# Patient Record
Sex: FEMALE | Race: OTHER | NOT HISPANIC OR LATINO | Employment: FULL TIME | URBAN - NONMETROPOLITAN AREA
[De-identification: names, ages, dates, MRNs, and addresses within clinical notes are randomized per-mention and may not be internally consistent; named-entity substitution may affect disease eponyms.]

---

## 2020-01-12 ENCOUNTER — HOSPITAL ENCOUNTER (EMERGENCY)
Facility: HOSPITAL | Age: 25
Discharge: HOME | End: 2020-01-12
Attending: EMERGENCY MEDICINE | Admitting: EMERGENCY MEDICINE

## 2020-01-12 VITALS
BODY MASS INDEX: 20.16 KG/M2 | DIASTOLIC BLOOD PRESSURE: 106 MMHG | WEIGHT: 121 LBS | OXYGEN SATURATION: 100 % | RESPIRATION RATE: 18 BRPM | TEMPERATURE: 98.9 F | HEIGHT: 65 IN | HEART RATE: 107 BPM | SYSTOLIC BLOOD PRESSURE: 175 MMHG

## 2020-01-12 DIAGNOSIS — J02.9 SORE THROAT: Primary | ICD-10-CM

## 2020-01-12 LAB
FLUAV AG NPH QL: NEGATIVE
FLUBV AG NPH QL IA: NEGATIVE
S PYO AG THROAT QL: NEGATIVE

## 2020-01-12 PROCEDURE — 87880 STREP A ASSAY W/OPTIC: CPT | Performed by: EMERGENCY MEDICINE

## 2020-01-12 PROCEDURE — 87804 INFLUENZA ASSAY W/OPTIC: CPT | Performed by: EMERGENCY MEDICINE

## 2020-01-12 PROCEDURE — 99283 EMERGENCY DEPT VISIT LOW MDM: CPT

## 2020-01-12 PROCEDURE — 87081 CULTURE SCREEN ONLY: CPT | Performed by: EMERGENCY MEDICINE

## 2020-01-13 NOTE — ED PROVIDER NOTES
"Subjective   This patient states last night she had \"a hot flash\" had open her windows and turn on her fan and then got cold.  She never actually checked her temperature.  She complains of a sore throat.  No cough.  No abdominal pain nausea vomiting or diarrhea.  She is here with her mother who has similar complaints.          Review of Systems   Constitutional: Positive for chills.   HENT: Positive for sore throat.    Eyes: Negative.    Respiratory: Negative.    Cardiovascular: Negative.    Gastrointestinal: Negative.    Genitourinary: Negative.    Musculoskeletal: Negative.    Skin: Negative.    Neurological: Negative.    Psychiatric/Behavioral: Negative.        History reviewed. No pertinent past medical history.    No Known Allergies    History reviewed. No pertinent surgical history.    History reviewed. No pertinent family history.    Social History     Socioeconomic History   • Marital status: Single     Spouse name: Not on file   • Number of children: Not on file   • Years of education: Not on file   • Highest education level: Not on file   Tobacco Use   • Smoking status: Former Smoker     Last attempt to quit: 2019     Years since quittin.0   Substance and Sexual Activity   • Alcohol use: Not Currently           Objective   Physical Exam   Constitutional: She appears well-developed and well-nourished.   HENT:   Head: Normocephalic and atraumatic.   Right Ear: Tympanic membrane normal.   Left Ear: Tympanic membrane normal.   Mouth/Throat: Uvula is midline, oropharynx is clear and moist and mucous membranes are normal. No oral lesions. No uvula swelling. No oropharyngeal exudate, posterior oropharyngeal edema, posterior oropharyngeal erythema or tonsillar abscesses.   Neck: Normal range of motion. Neck supple.   Cardiovascular: Normal rate, regular rhythm and normal heart sounds.   Pulmonary/Chest: Effort normal and breath sounds normal.   Abdominal: Soft.   Neurological: She is alert.   Skin: Skin " is warm and dry.   Psychiatric: She has a normal mood and affect. Her behavior is normal.   Nursing note and vitals reviewed.      Procedures           ED Course  ED Course as of Jan 12 2151   Sun Jan 12, 2020 2125 Influenza A Ag, EIA: Negative [TM]   2125 Influenza B Ag, EIA: Negative [TM]   2127 Strep A Ag: Negative [TM]      ED Course User Index  [TM] Ryne Interiano PA-C                                               MDM  9:51 PM  Negative flu and strep testing.  Patient looks very well overall nontoxic in appearance.  Suspect viral etiology given she is here with her mother with the same symptoms.  Strict return to care precautions given.  Final diagnoses:   Sore throat            Ryne Interiano PA-C  01/12/20 2152

## 2020-01-14 LAB — BACTERIA SPEC AEROBE CULT: NORMAL

## 2020-01-27 ENCOUNTER — APPOINTMENT (OUTPATIENT)
Dept: GENERAL RADIOLOGY | Facility: HOSPITAL | Age: 25
End: 2020-01-27

## 2020-01-27 ENCOUNTER — HOSPITAL ENCOUNTER (EMERGENCY)
Facility: HOSPITAL | Age: 25
Discharge: HOME | End: 2020-01-27
Attending: EMERGENCY MEDICINE | Admitting: EMERGENCY MEDICINE

## 2020-01-27 VITALS
TEMPERATURE: 98.5 F | WEIGHT: 124.8 LBS | SYSTOLIC BLOOD PRESSURE: 149 MMHG | HEIGHT: 65 IN | RESPIRATION RATE: 20 BRPM | BODY MASS INDEX: 20.79 KG/M2 | HEART RATE: 94 BPM | OXYGEN SATURATION: 100 % | DIASTOLIC BLOOD PRESSURE: 90 MMHG

## 2020-01-27 DIAGNOSIS — E05.00 THYROTOXICOSIS WITH DIFFUSE GOITER AND WITHOUT THYROID STORM: Primary | ICD-10-CM

## 2020-01-27 LAB
ALBUMIN SERPL-MCNC: 4.3 G/DL (ref 3.5–5.2)
ALBUMIN/GLOB SERPL: 1.1 G/DL
ALP SERPL-CCNC: 141 U/L (ref 39–117)
ALT SERPL W P-5'-P-CCNC: 20 U/L (ref 1–33)
AMPHET+METHAMPHET UR QL: NEGATIVE
AMPHETAMINES UR QL: NEGATIVE
ANION GAP SERPL CALCULATED.3IONS-SCNC: 14 MMOL/L (ref 5–15)
AST SERPL-CCNC: 20 U/L (ref 1–32)
B-HCG UR QL: NEGATIVE
BARBITURATES UR QL SCN: NEGATIVE
BASOPHILS # BLD AUTO: 0.02 10*3/MM3 (ref 0–0.2)
BASOPHILS NFR BLD AUTO: 0.3 % (ref 0–1.5)
BENZODIAZ UR QL SCN: NEGATIVE
BILIRUB SERPL-MCNC: 0.2 MG/DL (ref 0.2–1.2)
BUN BLD-MCNC: 9 MG/DL (ref 6–20)
BUN/CREAT SERPL: 22.5 (ref 7–25)
BUPRENORPHINE SERPL-MCNC: NEGATIVE NG/ML
CALCIUM SPEC-SCNC: 10.1 MG/DL (ref 8.6–10.5)
CANNABINOIDS SERPL QL: NEGATIVE
CHLORIDE SERPL-SCNC: 101 MMOL/L (ref 98–107)
CO2 SERPL-SCNC: 22 MMOL/L (ref 22–29)
COCAINE UR QL: NEGATIVE
CREAT BLD-MCNC: 0.4 MG/DL (ref 0.57–1)
DEPRECATED RDW RBC AUTO: 35.2 FL (ref 37–54)
EOSINOPHIL # BLD AUTO: 0.19 10*3/MM3 (ref 0–0.4)
EOSINOPHIL NFR BLD AUTO: 2.4 % (ref 0.3–6.2)
ERYTHROCYTE [DISTWIDTH] IN BLOOD BY AUTOMATED COUNT: 14 % (ref 12.3–15.4)
GFR SERPL CREATININE-BSD FRML MDRD: >150 ML/MIN/1.73
GFR SERPL CREATININE-BSD FRML MDRD: >150 ML/MIN/1.73
GLOBULIN UR ELPH-MCNC: 3.8 GM/DL
GLUCOSE BLD-MCNC: 121 MG/DL (ref 65–99)
HCT VFR BLD AUTO: 38.9 % (ref 34–46.6)
HGB BLD-MCNC: 12.5 G/DL (ref 12–15.9)
HOLD SPECIMEN: NORMAL
HOLD SPECIMEN: NORMAL
HYPOCHROMIA BLD QL: NORMAL
IMM GRANULOCYTES # BLD AUTO: 0.01 10*3/MM3 (ref 0–0.05)
IMM GRANULOCYTES NFR BLD AUTO: 0.1 % (ref 0–0.5)
LYMPHOCYTES # BLD AUTO: 3.67 10*3/MM3 (ref 0.7–3.1)
LYMPHOCYTES NFR BLD AUTO: 47 % (ref 19.6–45.3)
MAGNESIUM SERPL-MCNC: 2.2 MG/DL (ref 1.6–2.6)
MCH RBC QN AUTO: 23 PG (ref 26.6–33)
MCHC RBC AUTO-ENTMCNC: 32.1 G/DL (ref 31.5–35.7)
MCV RBC AUTO: 71.5 FL (ref 79–97)
METHADONE UR QL SCN: NEGATIVE
MICROCYTES BLD QL: NORMAL
MONOCYTES # BLD AUTO: 0.75 10*3/MM3 (ref 0.1–0.9)
MONOCYTES NFR BLD AUTO: 9.6 % (ref 5–12)
NEUTROPHILS # BLD AUTO: 3.17 10*3/MM3 (ref 1.7–7)
NEUTROPHILS NFR BLD AUTO: 40.6 % (ref 42.7–76)
NRBC BLD AUTO-RTO: 0 /100 WBC (ref 0–0.2)
OPIATES UR QL: NEGATIVE
OXYCODONE UR QL SCN: NEGATIVE
PCP UR QL SCN: NEGATIVE
PLAT MORPH BLD: NORMAL
PLATELET # BLD AUTO: 321 10*3/MM3 (ref 140–450)
PMV BLD AUTO: 10.8 FL (ref 6–12)
POTASSIUM BLD-SCNC: 3.5 MMOL/L (ref 3.5–5.2)
PROPOXYPH UR QL: NEGATIVE
PROT SERPL-MCNC: 8.1 G/DL (ref 6–8.5)
RBC # BLD AUTO: 5.44 10*6/MM3 (ref 3.77–5.28)
SODIUM BLD-SCNC: 137 MMOL/L (ref 136–145)
T3 SERPL-MCNC: 390 NG/DL (ref 80–200)
T4 FREE SERPL-MCNC: 4.81 NG/DL (ref 0.93–1.7)
TRICYCLICS UR QL SCN: NEGATIVE
TROPONIN T SERPL-MCNC: <0.01 NG/ML (ref 0–0.03)
TSH SERPL DL<=0.05 MIU/L-ACNC: 0.01 UIU/ML (ref 0.27–4.2)
WBC MORPH BLD: NORMAL
WBC NRBC COR # BLD: 7.81 10*3/MM3 (ref 3.4–10.8)
WHOLE BLOOD HOLD SPECIMEN: NORMAL
WHOLE BLOOD HOLD SPECIMEN: NORMAL

## 2020-01-27 PROCEDURE — 84480 ASSAY TRIIODOTHYRONINE (T3): CPT | Performed by: EMERGENCY MEDICINE

## 2020-01-27 PROCEDURE — 83735 ASSAY OF MAGNESIUM: CPT | Performed by: EMERGENCY MEDICINE

## 2020-01-27 PROCEDURE — 84484 ASSAY OF TROPONIN QUANT: CPT | Performed by: EMERGENCY MEDICINE

## 2020-01-27 PROCEDURE — 99284 EMERGENCY DEPT VISIT MOD MDM: CPT

## 2020-01-27 PROCEDURE — 80306 DRUG TEST PRSMV INSTRMNT: CPT | Performed by: EMERGENCY MEDICINE

## 2020-01-27 PROCEDURE — 84443 ASSAY THYROID STIM HORMONE: CPT | Performed by: EMERGENCY MEDICINE

## 2020-01-27 PROCEDURE — 93005 ELECTROCARDIOGRAM TRACING: CPT | Performed by: EMERGENCY MEDICINE

## 2020-01-27 PROCEDURE — 71045 X-RAY EXAM CHEST 1 VIEW: CPT

## 2020-01-27 PROCEDURE — 85025 COMPLETE CBC W/AUTO DIFF WBC: CPT | Performed by: EMERGENCY MEDICINE

## 2020-01-27 PROCEDURE — 85007 BL SMEAR W/DIFF WBC COUNT: CPT | Performed by: EMERGENCY MEDICINE

## 2020-01-27 PROCEDURE — 81025 URINE PREGNANCY TEST: CPT | Performed by: EMERGENCY MEDICINE

## 2020-01-27 PROCEDURE — 84439 ASSAY OF FREE THYROXINE: CPT | Performed by: EMERGENCY MEDICINE

## 2020-01-27 PROCEDURE — 96374 THER/PROPH/DIAG INJ IV PUSH: CPT

## 2020-01-27 PROCEDURE — 80053 COMPREHEN METABOLIC PANEL: CPT | Performed by: EMERGENCY MEDICINE

## 2020-01-27 RX ORDER — METHIMAZOLE 5 MG/1
5 TABLET ORAL ONCE
Status: COMPLETED | OUTPATIENT
Start: 2020-01-27 | End: 2020-01-27

## 2020-01-27 RX ORDER — METOPROLOL TARTRATE 5 MG/5ML
5 INJECTION INTRAVENOUS ONCE
Status: COMPLETED | OUTPATIENT
Start: 2020-01-27 | End: 2020-01-27

## 2020-01-27 RX ORDER — SODIUM CHLORIDE 0.9 % (FLUSH) 0.9 %
10 SYRINGE (ML) INJECTION AS NEEDED
Status: DISCONTINUED | OUTPATIENT
Start: 2020-01-27 | End: 2020-01-27 | Stop reason: HOSPADM

## 2020-01-27 RX ORDER — METHIMAZOLE 5 MG/1
5 TABLET ORAL 3 TIMES DAILY
Qty: 90 TABLET | Refills: 0 | Status: SHIPPED | OUTPATIENT
Start: 2020-01-27 | End: 2020-08-12

## 2020-01-27 RX ADMIN — METHIMAZOLE 5 MG: 5 TABLET ORAL at 04:42

## 2020-01-27 RX ADMIN — METOPROLOL TARTRATE 5 MG: 1 INJECTION, SOLUTION INTRAVENOUS at 02:35

## 2020-01-27 RX ADMIN — METOPROLOL TARTRATE 25 MG: 25 TABLET ORAL at 03:34

## 2020-01-27 NOTE — ED PROVIDER NOTES
Subjective   History of Present Illness    Chief Complaint: Racing heart rate  History of Present Illness: 24-year-old female with above complaint, current risk, feels paresthesias to her hands.  Never had any issues like this in the past.  Reported mild Goiter, found on recent physical exam for work.  And no thyroid meds.  Denies any illicit or recreational drugs or stimulants.  No birth control  Onset: Tonight just prior to arrival  Duration: Single episode persistent symptoms  Exacerbating / Alleviating factors: None  Associated symptoms: None      Nurses Notes reviewed and agree, including vitals, allergies, social history and prior medical history.     REVIEW OF SYSTEMS: All systems reviewed and not pertinent unless noted.    Positive for: Palpitations and paresthesias    Negative for: Chest pain syncope hemoptysis  Review of Systems    History reviewed. No pertinent past medical history.    No Known Allergies    History reviewed. No pertinent surgical history.    History reviewed. No pertinent family history.    Social History     Socioeconomic History   • Marital status: Single     Spouse name: Not on file   • Number of children: Not on file   • Years of education: Not on file   • Highest education level: Not on file   Tobacco Use   • Smoking status: Former Smoker     Last attempt to quit: 2019     Years since quittin.0   Substance and Sexual Activity   • Alcohol use: Not Currently           Objective   Physical Exam      GENERAL APPEARANCE: Well developed, well nourished, in no acute distress.  VITAL SIGNS: per nursing, reviewed and noted  SKIN: no rashes, ulcerations or petechiae.  Head: Normocephalic, atraumatic.   EYES: perrla. EOMI.  ENT:  TM clear, posterior pharynx patent.  LUNGS:  normal breath sounds. No retractions.   CARDIOVASCULAR:  regular rhythm with tachycardia, no murmurs.  Good Peripheral pulses.  ABDOMEN: Soft, nontender, normal bowel sounds. No hernia. No  ascites.  MUSCULOSKELETAL:  No tenderness. Full ROM. Strength and tone normal.  NEUROLOGIC: Alert, oriented x 3. No gross deficits.   NECK: Supple, symmetric. No tenderness, symmetric diffuse fullness of the thyroid. Full ROM  Back: full rom, no paraspinal spasm. No CVA tenderness.   PSYCH: appropriate affect, no suicidal or homicidal ideation.  : no bladder tenderness or distention, no CVA tenderness      Procedures     No attending provider procedures were performed      ED Course  ED Course as of Jan 28 0145   Mon Jan 27, 2020 0319 HCG, Urine QL: Negative [PF]   0319 Troponin T: <0.010 [PF]   0319 Magnesium: 2.2 [PF]   0321 WBC: 7.81 [PF]   0321 Hemoglobin: 12.5 [PF]   0321 Hematocrit: 38.9 [PF]   0321 Platelets: 321 [PF]   0321 Glucose(!): 121 [PF]   0321 BUN: 9 [PF]   0321 Creatinine(!): 0.40 [PF]   0321 Sodium: 137 [PF]   0321 Potassium: 3.5 [PF]   0321 Chloride: 101 [PF]   0321 CO2: 22.0 [PF]   0321 Calcium: 10.1 [PF]   0321 Total Protein: 8.1 [PF]   0321 Albumin: 4.30 [PF]   0329 TSH Baseline(!): 0.011 [PF]   0449 EKG interpreted by me reveals sinus tachycardia at a rate of 153.  No ectopy no ischemic changes    [PF]      ED Course User Index  [PF] Tang Gonzalez,                                                MDM  Work-up consistent with thyrotoxicosis, added methimazole and metoprolol for patient's high blood pressure and tachycardia.  Advised outpatient follow-up and return precautions were discussed in detail.  Final diagnoses:   Thyrotoxicosis with diffuse goiter and without thyroid storm            Tang Gonzalez,   01/28/20 0145

## 2020-02-26 ENCOUNTER — TRANSCRIBE ORDERS (OUTPATIENT)
Dept: ULTRASOUND IMAGING | Facility: HOSPITAL | Age: 25
End: 2020-02-26

## 2020-02-26 DIAGNOSIS — E04.9 ENLARGED THYROID: Primary | ICD-10-CM

## 2020-05-26 ENCOUNTER — TELEPHONE (OUTPATIENT)
Dept: SURGERY | Facility: CLINIC | Age: 25
End: 2020-05-26

## 2020-05-26 NOTE — TELEPHONE ENCOUNTER
Patient no showed for appointment with Dr Reyez . Called patient she stated she  Is working and will have to call back at a later date and reschedule. No show letter mailed to patient.

## 2020-07-14 ENCOUNTER — TELEMEDICINE (OUTPATIENT)
Dept: ENDOCRINOLOGY | Facility: CLINIC | Age: 25
End: 2020-07-14

## 2020-07-14 VITALS — HEIGHT: 65 IN | WEIGHT: 139 LBS | BODY MASS INDEX: 23.16 KG/M2

## 2020-07-14 DIAGNOSIS — E05.90 HYPERTHYROIDISM: Primary | ICD-10-CM

## 2020-07-14 PROCEDURE — 99203 OFFICE O/P NEW LOW 30 MIN: CPT | Performed by: INTERNAL MEDICINE

## 2020-07-14 NOTE — PROGRESS NOTES
Chief Complaint   Patient presents with   • Establish Care   • Hyperthyroidism        New patient who is being seen in consultation regarding hyperthyroidism at the request of Ivania Carter APRN HPI   Gillian Marlow is a 24 y.o. female who presents for evaluation of hyperthyroidism.    This was an audio and video enabled telemedicine encounter. A minimum of 20 minutes was spent in contact with the patient.    Patient presents today for evaluation of hyperthyroidism which was diagnosed in January 2020 when patient presented with symptoms of elevated heart rate. Patient denies known history of thyroid dysfunction prior to this. Patient was started on methimazole and metoprolol.    She is currently taking methimazole 10 mg twice daily and metoprolol 25 mg twice daily.  She reports that since last changed which is on endocrinology at   in March.    Patient reports palpitations and anxiety are much improved. She had anxiety prior to this diagnosis.   Patient denies changes in bowel habits.   Patient reports heat intolerance.  Patient reports changes in weight. Patient had initial inability to gain weight but more recently has gained weight.  Patient  Denies hair, skin or nail changes.  Patient reports tremor has resolved.  Patient reports good energy level.    Patient denies history of previous head/neck radiation.  Patient denies history of recent iodine exposure.  Patient denies taking OTC supplements such as biotin.  Patient denies personal or family history of thyroid cancer.     History reviewed. No pertinent past medical history.  History reviewed. No pertinent surgical history.   Family History   Problem Relation Age of Onset   • Anemia Mother    • No Known Problems Father    • Thyroid disease Other       Social History     Socioeconomic History   • Marital status: Single     Spouse name: Not on file   • Number of children: Not on file   • Years of education: Not on file   • Highest education level: Not on  "file   Tobacco Use   • Smoking status: Former Smoker     Last attempt to quit: 2019     Years since quittin.5   Substance and Sexual Activity   • Alcohol use: Not Currently      No Known Allergies   Current Outpatient Medications on File Prior to Visit   Medication Sig Dispense Refill   • methIMAzole (TAPAZOLE) 5 MG tablet Take 1 tablet by mouth 3 (Three) Times a Day. (Patient taking differently: Take 10 mg by mouth 2 (Two) Times a Day.) 90 tablet 0   • metoprolol tartrate (LOPRESSOR) 25 MG tablet Take 1 tablet by mouth 2 (Two) Times a Day. 60 tablet 0     No current facility-administered medications on file prior to visit.         Review of Systems   Constitutional: Positive for unexpected weight gain and unexpected weight loss. Negative for fatigue.   HENT: Negative for trouble swallowing and voice change.    Eyes: Negative for pain and visual disturbance.   Respiratory: Negative for cough and shortness of breath.    Cardiovascular: Negative for chest pain and palpitations.   Gastrointestinal: Negative for constipation and diarrhea.   Endocrine: Positive for heat intolerance. Negative for cold intolerance.   Musculoskeletal: Negative for arthralgias and myalgias.   Skin: Negative for dry skin and rash.   Neurological: Positive for headache. Negative for tremors.   Psychiatric/Behavioral: Negative for sleep disturbance. The patient is nervous/anxious.       Vitals:    20 1040   Weight: 63 kg (139 lb)   Height: 165.1 cm (65\")   Body mass index is 23.13 kg/m².     Physical Exam   General: well appearing, in no acute distress  Eyes:pupils equal and round  ENMT: normal external appearance of the ears and nose  Respiratory: no increased work of breathing  Musculoskeletal: no obvious deformity  Skin: no rash or ulcerations noted  Neuro: alert, answers questions appropriately  Psych: appropriate mood and affect    Labs/Imaging  Results for DINORA FERREIRA (MRN [id number]) as of 2020 10:36   Ref. " Range 1/27/2020 02:27   TSH Baseline Latest Ref Range: 0.270 - 4.200 uIU/mL 0.011 (L)   Free T4 Latest Ref Range: 0.93 - 1.70 ng/dL 4.81 (H)   T3, Total Latest Ref Range: 80.0 - 200.0 ng/dl 390.0 (H)       Assessment and Plan    Gillian was seen today for establish care and hyperthyroidism.    Diagnoses and all orders for this visit:    Hyperthyroidism  - symptomatically improved since diagnosis, no repeat labs available  - will obtain prior records from [facility] and recent labs from PCP  - currently taking metoprolol 25 mg daily  - currently taking methimazole 10 mg twice daily- adjust as needed after review of labs.  -- Potential risks of untreated hyperthyroidism were discussed with patient.  -The risks and benefits of methimazole and when to seek care were reviewed with the patient. Potential risks include, but are not limited to, hepatic dysfunction, agranulocytosis, rash, vasculitis, iatrogenic hypothyroidism.  - Risks associated with thyroid hormone dysregulation as well as the risks associated with methimazole use during pregnancy were discussed with the patient.  Patient was counseled to use reliable contraception.  If she were to become pregnant, patient instructed to contact the clinic for further instructions.  - symptoms of both hypothyroidism and hyperthyroidism were discussed with the patient. Patient to contact the clinic in the interim between visits with any concerning changes.  - briefly discussed options for long term management of hyperthyroidism including methimazole use, LEGGETT and thyroidectomy. Will discuss further at follow up.     Addendum dated 8/12/2020, reviewed prior labs from PCP  Labs dated 6/19/2020  Free T3 2.5  Total T3 91  TSH 1.7  Free T4 0.75  Free T4 slightly low, remainder TFTs normal, will plan to reduce methimazole to 15 mg daily.    Return in about 3 months (around 10/14/2020) for Next scheduled follow up. The patient was instructed to contact the clinic with any interval questions  or concerns.    Dayan Shea MD     Please note that portions of this document were completed using a voice recognition program. Efforts were made to edit the dictations, but occasionally words are mis-transcribed.

## 2020-07-15 ENCOUNTER — TELEPHONE (OUTPATIENT)
Dept: ENDOCRINOLOGY | Facility: CLINIC | Age: 25
End: 2020-07-15

## 2020-07-15 NOTE — TELEPHONE ENCOUNTER
Fax request was faxed to:  [phone], [facility].    Requested recent labs (2 weeks ago).

## 2020-07-15 NOTE — TELEPHONE ENCOUNTER
----- Message from Dayan Shea MD sent at 7/14/2020 10:33 AM EDT -----  Could you please contact the [facility] in [place] to get her labs from approximately 2 weeks ago?

## 2020-08-12 DIAGNOSIS — E05.90 HYPERTHYROIDISM: Primary | ICD-10-CM

## 2020-08-12 RX ORDER — METHIMAZOLE 5 MG/1
15 TABLET ORAL DAILY
Qty: 90 TABLET | Refills: 1 | Status: SHIPPED | OUTPATIENT
Start: 2020-08-12 | End: 2020-08-13 | Stop reason: SDUPTHER

## 2020-08-13 ENCOUNTER — TELEPHONE (OUTPATIENT)
Dept: ENDOCRINOLOGY | Facility: CLINIC | Age: 25
End: 2020-08-13

## 2020-08-13 DIAGNOSIS — E05.90 HYPERTHYROIDISM: ICD-10-CM

## 2020-08-13 RX ORDER — METHIMAZOLE 5 MG/1
15 TABLET ORAL DAILY
Qty: 90 TABLET | Refills: 1 | Status: SHIPPED | OUTPATIENT
Start: 2020-08-13 | End: 2020-09-08 | Stop reason: DRUGHIGH

## 2020-08-13 NOTE — TELEPHONE ENCOUNTER
Informed patient of lab results, pt voiced understanding.    Dr. Shea, please advise on rx for Methimazole, can we send a rx for 5 mg.  Patient needs to take 15 mg daily.    Thank you.

## 2020-08-13 NOTE — TELEPHONE ENCOUNTER
PATIENT STATES SHE RECEIVED A CALL FROM US ABOUT LAB RESULTS. SHE IS RETURNING THE CALL AND WOULD LIKE A CALL BACK TO DISCUSS LABS. PHONE NUMBER [phone]

## 2020-08-13 NOTE — TELEPHONE ENCOUNTER
pt states the pharmacy did not have the rx for 5 mg, will resend.  Patient state she will continue to take the medication she has left over, by taking one pill and scoring another to get half of it.    Patient asked if she can use a knife to score a pill.  Informed patient that we don't recommend this, but if she is able to do this and not waste a pill then go ahead.  Normally a pill is score with a pill cutter, but patient does not have one.

## 2020-09-02 NOTE — PROGRESS NOTES
Patient: Gillian Marolw    YOB: 1995    Date: 09/08/2020    Primary Care Provider: Ivania Carter APRN    Chief Complaint   Patient presents with   • Establish Care   • Cyst     left side of face for 1 year and has gotten bigger since        Subjective .     History of present illness: Patient with a 1 year history of a left facial lesion that has grown during that time.  She states that has no associated pain.  No drainage.  Nothing makes it better or worse.    The following portions of the patient's history were reviewed and updated as appropriate: allergies, current medications, past family history, past medical history, past social history, past surgical history and problem list.    Review of Systems   Constitutional: Negative for chills, fever and unexpected weight change.   HENT: Negative for trouble swallowing and voice change.    Eyes: Negative for visual disturbance.   Respiratory: Negative for apnea, cough, chest tightness and wheezing.    Cardiovascular: Negative for chest pain, palpitations and leg swelling.   Gastrointestinal: Negative for abdominal distention, abdominal pain, anal bleeding, blood in stool, constipation, diarrhea, nausea, rectal pain and vomiting.   Endocrine: Negative for cold intolerance and heat intolerance.   Genitourinary: Negative for difficulty urinating, dysuria, flank pain and hematuria.   Musculoskeletal: Negative for back pain, gait problem and joint swelling.   Skin: Negative for color change, rash and wound.   Neurological: Negative for dizziness, syncope, speech difficulty, weakness, numbness and headaches.   Hematological: Negative for adenopathy. Does not bruise/bleed easily.   Psychiatric/Behavioral: Negative for confusion. The patient is not nervous/anxious.        History:  Past Medical History:   Diagnosis Date   • Thyroid disease        Past Surgical History:   Procedure Laterality Date   • NO PAST SURGERIES         Family History   Problem Relation  "Age of Onset   • Anemia Mother    • No Known Problems Father    • Thyroid disease Other        Social History     Tobacco Use   • Smoking status: Former Smoker     Last attempt to quit: 2019     Years since quittin.7   • Smokeless tobacco: Never Used   Substance Use Topics   • Alcohol use: Not Currently   • Drug use: Never        Allergies:  No Known Allergies    Medications:    Current Outpatient Medications:   •  methIMAzole (TAPAZOLE) 10 MG tablet, , Disp: , Rfl:   •  metoprolol tartrate (LOPRESSOR) 25 MG tablet, Take 1 tablet by mouth 2 (Two) Times a Day., Disp: 60 tablet, Rfl: 0    Objective     Vital Signs:   Vitals:    20 0947   BP: 110/80   Pulse: 78   SpO2: 98%   Weight: 62.6 kg (138 lb)   Height: 165.1 cm (65\")       Physical Exam:  General Appearance:    Alert, cooperative, in no acute distress   Head:    Normocephalic, without obvious abnormality, atraumatic   Eyes:            Normal.  No scleral icterus.  PERRLA    Lungs:     Clear to auscultation,respirations regular, even and                  unlabored    Heart:    Regular rhythm and normal rate, normal S1 and S2, no            murmur   Abdomen:     Normal bowel sounds, no masses, no organomegaly, soft        non-tender, non-distended, no guarding,    Extremities:   Moves all extremities well, no edema, no cyanosis, no             redness   Skin:  Left preauricular 2 cm likely subcutaneous sebaceous cyst  although it is overlying the parotid gland.  Slightly mobile   Neurologic:   Normal without gross deficits.   Psychiatric: No evidence of depression or anxiety          Results Review:   I reviewed the patient's new clinical results.  Ultrasound was reviewed    Review of Systems was reviewed and confirmed as accurate as documented by the MA.    Assessment/Plan     1. Soft tissue mass      Patient with what appears to be a sebaceous cyst based on ultrasound rather than a parotid tumor.  I have offered to remove this for her.  She " understands the procedure as well as the risk of bleeding and infection and she wishes to proceed.      Electronically signed by Dawson Reyez MD  09/08/20  10:04

## 2020-09-08 ENCOUNTER — OFFICE VISIT (OUTPATIENT)
Dept: SURGERY | Facility: CLINIC | Age: 25
End: 2020-09-08

## 2020-09-08 VITALS
WEIGHT: 138 LBS | SYSTOLIC BLOOD PRESSURE: 110 MMHG | BODY MASS INDEX: 22.99 KG/M2 | DIASTOLIC BLOOD PRESSURE: 80 MMHG | HEART RATE: 78 BPM | HEIGHT: 65 IN | OXYGEN SATURATION: 98 %

## 2020-09-08 DIAGNOSIS — M79.89 SOFT TISSUE MASS: Primary | ICD-10-CM

## 2020-09-08 DIAGNOSIS — L72.3 SEBACEOUS CYST: Primary | ICD-10-CM

## 2020-09-08 PROCEDURE — 99203 OFFICE O/P NEW LOW 30 MIN: CPT | Performed by: SURGERY

## 2020-09-08 RX ORDER — METHIMAZOLE 10 MG/1
TABLET ORAL
COMMUNITY
Start: 2020-08-10 | End: 2020-09-17

## 2020-09-17 ENCOUNTER — TELEPHONE (OUTPATIENT)
Dept: ENDOCRINOLOGY | Facility: CLINIC | Age: 25
End: 2020-09-17

## 2020-09-17 DIAGNOSIS — E05.90 HYPERTHYROIDISM: Primary | ICD-10-CM

## 2020-09-17 RX ORDER — METHIMAZOLE 5 MG/1
5 TABLET ORAL DAILY
Qty: 90 TABLET | Refills: 11 | Status: CANCELLED | OUTPATIENT
Start: 2020-09-17 | End: 2021-09-17

## 2020-09-17 RX ORDER — METHIMAZOLE 5 MG/1
5 TABLET ORAL DAILY
Qty: 90 TABLET | Refills: 5 | Status: SHIPPED | OUTPATIENT
Start: 2020-09-17 | End: 2020-11-12 | Stop reason: SDUPTHER

## 2020-09-17 RX ORDER — METHIMAZOLE 10 MG/1
15 TABLET ORAL DAILY
Qty: 45 TABLET | Refills: 5 | Status: SHIPPED | OUTPATIENT
Start: 2020-09-17 | End: 2020-09-17

## 2020-09-17 NOTE — TELEPHONE ENCOUNTER
PT NEEDS A REFILL ON HER METHIMAZOLE  SHE STATES THAT THE DOSAGE WAS CHANGED FROM 10 TO 15 SO THE RX NEEDS TO BE CHANGED. THE REFILL WAS SENT IN WITH THE WRONG DOSAGE AND TO THE WRONG PHARM.    NEEDS TO BE SENT TO [facility] IN [place], [facility] THAN [facility].    PT IS COMPLETELY OUT OF HER MEDICATION, CAN THIS PLEASE BE SENT IN TODAY.

## 2020-09-17 NOTE — TELEPHONE ENCOUNTER
Sent refill for Methimazole 5 mg x 3 tabs daily. To the [place] pharmacy patient requested.    OK to send refill per Dr. Shea's note.

## 2020-09-17 NOTE — TELEPHONE ENCOUNTER
Of note, I have not previously prescribed methimazole for patient.     Refills for methimazole 15  Mg daily sent to patient's preferred pharmacy

## 2020-09-17 NOTE — TELEPHONE ENCOUNTER
Patient requesting we send Methimazole 5 mg, x 3 tabs daily to her pharmacy [facility].     Patient states she does not want the 10 mg dose because she does not have a pill cutter.  Patient was upset and rude over the phone.      Dr. Shea, please advise, OK to send Methimazole 5 mg x 3 tabs daily?  Thank you.

## 2020-09-17 NOTE — TELEPHONE ENCOUNTER
Dr. Shea, please advise on dose for Methimazole.  Thank you.    Currently, we have a dose of 10 mg in the chart.  There are no directions.  Patient needs to take 15 mg, pt would take 1 and 1/2 tabs?

## 2020-09-17 NOTE — TELEPHONE ENCOUNTER
Okay to send methimazole 5 mg tablets, 3 times daily, please remove the other prescription from our system.

## 2020-10-06 ENCOUNTER — PROCEDURE VISIT (OUTPATIENT)
Dept: SURGERY | Facility: CLINIC | Age: 25
End: 2020-10-06

## 2020-10-06 DIAGNOSIS — L72.3 SEBACEOUS CYST: Primary | ICD-10-CM

## 2020-10-06 PROCEDURE — 11442 EXC FACE-MM B9+MARG 1.1-2 CM: CPT | Performed by: SURGERY

## 2020-10-06 PROCEDURE — 12051 INTMD RPR FACE/MM 2.5 CM/<: CPT | Performed by: SURGERY

## 2020-10-12 NOTE — PROGRESS NOTES
"Patient: Gillian Marlow    YOB: 1995    Date: 10/13/2020    Primary Care Provider: Ivania Carter APRN    Chief Complaint   Patient presents with   • Post-op Follow-up     excision       History of present illness:  I saw the patient in the office today as a followup from their recent lesion/sebaceous cyst excision on the left face.  They state that they have done well and are having no problems.    Vital Signs:  Vitals:    10/13/20 1118   BP: 132/84   Pulse: 85   Temp: 98.2 °F (36.8 °C)   TempSrc: Temporal   SpO2: 98%   Weight: 62.6 kg (138 lb 0.1 oz)   Height: 165.1 cm (65\")       Physical Exam:   General Appearance:    Alert, cooperative, in no acute distress, wound clean dry without infection       Assessment / Plan:    1. Postoperative visit        Wound is healing nicely.  No evidence of infection.  Doing well.  Follow-up as needed.    Electronically signed by Dawson Reyez MD  10/13/20    Portions of this note have been scribed for Dawson Reyez MD by Michelle Milligan. 10/13/2020  11:24 EDT                        "

## 2020-10-13 ENCOUNTER — OFFICE VISIT (OUTPATIENT)
Dept: SURGERY | Facility: CLINIC | Age: 25
End: 2020-10-13

## 2020-10-13 VITALS
HEART RATE: 85 BPM | TEMPERATURE: 98.2 F | OXYGEN SATURATION: 98 % | HEIGHT: 65 IN | DIASTOLIC BLOOD PRESSURE: 84 MMHG | SYSTOLIC BLOOD PRESSURE: 132 MMHG | BODY MASS INDEX: 22.99 KG/M2 | WEIGHT: 138.01 LBS

## 2020-10-13 DIAGNOSIS — Z48.89 POSTOPERATIVE VISIT: Primary | ICD-10-CM

## 2020-10-13 PROCEDURE — 99024 POSTOP FOLLOW-UP VISIT: CPT | Performed by: SURGERY

## 2020-10-14 ENCOUNTER — TELEMEDICINE (OUTPATIENT)
Dept: ENDOCRINOLOGY | Facility: CLINIC | Age: 25
End: 2020-10-14

## 2020-10-14 VITALS — BODY MASS INDEX: 22.97 KG/M2 | HEIGHT: 65 IN

## 2020-10-14 DIAGNOSIS — E05.90 HYPERTHYROIDISM: Primary | ICD-10-CM

## 2020-10-14 PROCEDURE — 99213 OFFICE O/P EST LOW 20 MIN: CPT | Performed by: INTERNAL MEDICINE

## 2020-10-14 NOTE — PROGRESS NOTES
"Chief Complaint   Patient presents with   • Follow-up   • Hyperthyroidism        HPI   Gillian Marlow is a 25 y.o. female had concerns including Follow-up and Hyperthyroidism.      This was an audio and video enabled telemedicine encounter.  Total of 13 minutes was spent in direct contact with the patient    Patient reports that she has been doing generally well in the interim since her last visit.  She does report that she has had a cyst removed from her cheek since last appointment but states that this is healing well.  She is continued on methimazole 15 mg daily and metoprolol 25 mg twice daily.  She denies any issues with these medications or new concerns.  She reports that her weight has been stable.  She denies any palpitations, diarrhea, constipation.  She denies any difficulty breathing, difficulty swallowing, voice changes.    The following portions of the patient's history were reviewed and updated as appropriate: allergies, current medications and past social history.    Review of Systems   Constitutional: Negative for unexpected weight gain and unexpected weight loss.   Cardiovascular: Negative for palpitations.   Gastrointestinal: Negative for constipation and diarrhea.      Ht 165.1 cm (65\")   BMI 22.97 kg/m²      Physical Exam    General: well appearing, in no acute distress  Respiratory: no increased work of breathing  Neuro: alert, answers questions appropriately  Psych: appropriate mood and affect    Labs/Imaging  Labs dated 6/19/2020  Free T3 2.5  Total T3 91  TSH 1.7  Free T4 0.75    Diagnoses and all orders for this visit:    1. Hyperthyroidism (Primary)  -Patient currently taking methimazole 15 mg daily and metoprolol 25 mg twice daily  -Patient is clinically euthyroid  -We will again request prior endocrine note from [facility].  Suspect underlying Graves' disease as etiology given patient's appearance that this is difficult to confirm without being able to perform a thyroid exam.  -We will plan for " repeat thyroid function testing and antibody testing.  -We will adjust methimazole as needed based on labs  -Discussed potential options for long-term management and how these may vary based on etiology of patient's hyperthyroidism.  -Reviewed need for use of reliable contraception while thyroid hormone remains abnormal and again reviewed that methimazole is known to cross the placenta and can have adverse effects on potential fetus.  -Next steps to be determined based on repeat labs, symptoms of hyperthyroidism reviewed in detail with the patient.  Patient instructed to contact clinic in the interim between visits with any concerning changes.  -     TSH; Future  -     T4, Free; Future  -     T3, Free; Future  -     Thyroid Stimulating Immunoglobulin; Future         Return in about 3 months (around 1/14/2021). The patient was instructed to contact the clinic with any interval questions or concerns.    Dayan Shea MD   Endocrinologist    Please note that portions of this document were completed with a voice recognition program. Efforts were made to edit the dictations, but occasionally words are mis-transcribed.

## 2020-10-19 ENCOUNTER — RESULTS ENCOUNTER (OUTPATIENT)
Dept: ENDOCRINOLOGY | Facility: CLINIC | Age: 25
End: 2020-10-19

## 2020-10-19 DIAGNOSIS — E05.90 HYPERTHYROIDISM: ICD-10-CM

## 2020-11-12 ENCOUNTER — TELEPHONE (OUTPATIENT)
Dept: ENDOCRINOLOGY | Facility: CLINIC | Age: 25
End: 2020-11-12

## 2020-11-12 RX ORDER — METHIMAZOLE 5 MG/1
10 TABLET ORAL DAILY
Qty: 60 TABLET | Refills: 5 | Status: SHIPPED | OUTPATIENT
Start: 2020-11-12 | End: 2021-05-10 | Stop reason: SDUPTHER

## 2020-11-12 NOTE — TELEPHONE ENCOUNTER
----- Message from Dayan Shea MD sent at 11/12/2020 10:59 AM EST -----  See result letter, please contact patient regarding dose change

## 2021-01-14 ENCOUNTER — TELEPHONE (OUTPATIENT)
Dept: ENDOCRINOLOGY | Facility: CLINIC | Age: 26
End: 2021-01-14

## 2021-01-14 NOTE — TELEPHONE ENCOUNTER
Patient wanted to let Dr Shea know that she had to r/s her appt on 1/26. Nov is scheduled for 2/18/2021.

## 2021-02-11 NOTE — TELEPHONE ENCOUNTER
RX ORIGINALLY WRITTEN BY ANOTHER PROVIDER. PT STATES THAT IT WAS PRESCRIBED BY HER PREVIOUS ENDO PROVIDER FOR PALPITATIONS.

## 2021-02-18 ENCOUNTER — TELEPHONE (OUTPATIENT)
Dept: ENDOCRINOLOGY | Facility: CLINIC | Age: 26
End: 2021-02-18

## 2021-02-18 NOTE — TELEPHONE ENCOUNTER
Pt stated that she was supposed to have a virtual appt, and was advised to go through doxy. She stated that she was in the virtual room and after waiting 15 + minutes she had to go back to work. She is upset and is requesting to talk to . She stated that she works 8 a.m to 4:30 everyday. Please advise on a sooner appt that may work best for her?

## 2021-02-18 NOTE — TELEPHONE ENCOUNTER
Rescheduled Doxy appt. with pt.  Due to my mistake had to reschedule pt.  Dr. Shea did not know pt was logged in to Doxy, was expecting pt via Sphere 3d video.    Patient accepted my apology and was fine with rescheduling f/u appointment.      Asked pt to call me a few minutes before her 2 pm appt. On 02/23/21 in order to make sure all is fine and she is logged in to Dox[facility].  Informed pt her appt. Is only 15 minutes.  Pt voiced understanding.

## 2021-02-19 NOTE — TELEPHONE ENCOUNTER
Patient called back stating that the time wasn't a good time for her anymore due to work, but said she could do it during her lunch on Tuesday at 11:15-12:15? Please advise and let the patient know if we can squeeze her in elsewhere.

## 2021-02-19 NOTE — TELEPHONE ENCOUNTER
Please contact patient. I do not have an official opening during the time she requested. I can try to squeeze her in at noon, but please let patient know that it is possible I could be running behind as this is not a formal opening in my schedule.    Also, okay to rebook into a different OB slot.

## 2021-02-23 ENCOUNTER — TELEPHONE (OUTPATIENT)
Dept: ENDOCRINOLOGY | Facility: CLINIC | Age: 26
End: 2021-02-23

## 2021-02-23 NOTE — TELEPHONE ENCOUNTER
Informed patient of Dr. Shea's recommendations, regarding review labs from PCP and scheduling f/u appt.  Patient would like to schedule a Doxy video visit for 04/27/21 at 4 pm.    Dr. Shea, please advise on scheduling the appointment.  I was unable to schedule it myself.  Thank you.

## 2021-02-23 NOTE — TELEPHONE ENCOUNTER
----- Message from Dayan Shea MD sent at 2/23/2021  2:28 PM EST -----  Regarding: Visit Follow-Up Question  Contact: [phone]      ----- Message -----  From: Edna Woody MA  Sent: 2/23/2021   1:52 PM EST  To: Dayan Shea MD  Subject: Visit Follow-Up Question                             ----- Message -----  From: Gillian Marlow  Sent: 2/23/2021  12:22 PM EST  To: e [facility]  Subject: Visit Follow-Up Question                         Good afternoon Dr. Shea,     I do apologize about the inconvenience but I had to cancel our e visit today due to my job not allowing me off during that time. I may have to go back through my nurse practitioner Emma at the [facility] for my follow up appointments and such and allowing her to correspond with you  because that's more convenient for me rather than having to take off work. I will setting up an appointment with her soon so she can follow up with you. Thank you.

## 2021-04-27 ENCOUNTER — TELEMEDICINE (OUTPATIENT)
Dept: ENDOCRINOLOGY | Facility: CLINIC | Age: 26
End: 2021-04-27

## 2021-04-27 VITALS — HEIGHT: 65 IN | BODY MASS INDEX: 21.66 KG/M2 | WEIGHT: 130 LBS

## 2021-04-27 DIAGNOSIS — E05.90 HYPERTHYROIDISM: Primary | ICD-10-CM

## 2021-04-27 PROCEDURE — 99213 OFFICE O/P EST LOW 20 MIN: CPT | Performed by: INTERNAL MEDICINE

## 2021-04-27 NOTE — PROGRESS NOTES
"Chief Complaint   Patient presents with   • Follow-up   • Hyperthyroidism        HPI   Gillian Marlow is a 25 y.o. female had concerns including Follow-up and Hyperthyroidism.      This was an audio and video enabled telemedicine encounter.    Patient reports he has been doing generally well in the interim since her last visit.  She continues on methimazole 10 mg daily metoprolol 25 mg twice daily.  She does report intermittent palpitations which are mild and denies any other concerns.  She denies any changes in bowel habits, weight changes.    The following portions of the patient's history were reviewed and updated as appropriate: allergies, current medications and past social history.    Review of Systems   Constitutional: Negative for unexpected weight gain and unexpected weight loss.   Cardiovascular: Positive for palpitations.   Gastrointestinal: Negative for constipation and diarrhea.   Endocrine: Negative for cold intolerance and heat intolerance.        Ht 165.1 cm (65\")   Wt 59 kg (130 lb) Comment: pt provided weight  BMI 21.63 kg/m²      Physical Exam      General: well appearing, in no acute distress  Cardiovascular: no lower extremity edema, no digital cyanosis  Respiratory: no increased work of breathing  Neuro: alert, answers questions appropriately  Psych: appropriate mood and affect    Labs/Imaging  Labs dated 11/10/2020  Free T3 2  TSH 4.03  Free T4 0.64    Diagnoses and all orders for this visit:    1. Hyperthyroidism (Primary)  -likely secondary to Graves' disease given history, however, prior antibody testing is not available  -Currently taking methimazole 10 mg daily, due for repeat labs  -Patient reports intermittent palpitations, otherwise clinically euthyroid, she is taking metoprolol 25 mg twice daily  -We will tentatively plan to wean methimazole, as able, with goal of remission  -- Potential risks of untreated hyperthyroidism were discussed with patient.  -The risks and benefits of " methimazole and when to seek care were reviewed with the patient. Potential risks include, but are not limited to, hepatic dysfunction, agranulocytosis, rash, vasculitis, iatrogenic hypothyroidism.  - Risks associated with thyroid hormone dysregulation as well as the risks associated with methimazole use during pregnancy were discussed with the patient.  Patient was counseled to use reliable contraception.  If she were to become pregnant, patient instructed to contact the clinic for further instructions.  -     TSH  -     Thyroid Stimulating Immunoglobulin  -     T4, Free  -     T3, Free  -     Comprehensive Metabolic Panel  -     CBC & Differential    Patient was given orders for labs to be completed outside of [facility]. Patient instructed to contact clinic if they have not heard from this office regarding results within 1 week of lab draw to ensure that results were received by this office.     Return in about 3 months (around 7/27/2021) for Next scheduled follow up. The patient was instructed to contact the clinic with any interval questions or concerns.    Dayan Shea MD   Endocrinologist    Please note that portions of this document were completed with a voice recognition program. Efforts were made to edit the dictations, but occasionally words are mis-transcribed.

## 2021-05-07 ENCOUNTER — TELEPHONE (OUTPATIENT)
Dept: ENDOCRINOLOGY | Facility: CLINIC | Age: 26
End: 2021-05-07

## 2021-05-10 RX ORDER — METHIMAZOLE 5 MG/1
5 TABLET ORAL DAILY
Qty: 30 TABLET | Refills: 5 | Status: SHIPPED | OUTPATIENT
Start: 2021-05-10 | End: 2021-06-07 | Stop reason: SDUPTHER

## 2021-05-10 NOTE — TELEPHONE ENCOUNTER
Patient requesting refill for Methimazole.    Rx pending.    LOV:  04/27/21  Future visit:  07/19/21

## 2021-05-27 ENCOUNTER — TELEPHONE (OUTPATIENT)
Dept: ENDOCRINOLOGY | Facility: CLINIC | Age: 26
End: 2021-05-27

## 2021-05-27 NOTE — TELEPHONE ENCOUNTER
PT CALLED WANTING TO KNOW IF SHE SHOULD BE TAKING METHIMAZOLE 2x A DAY. PLEASE CONFER W/ DR AND REACH OUT TO PT. THANK YOU

## 2021-05-27 NOTE — TELEPHONE ENCOUNTER
Pt states she was taking 2 tabs daily for her Methimazole, pt made a mistake.  Informed pt of correct instructions for her Methimazole, 1 tab daily.    Pt states, since she finished the 30 day supply earlier in the month.  Pt went ahead and got another refill for methimazole and paid out of pocket.

## 2021-06-07 ENCOUNTER — PRIOR AUTHORIZATION (OUTPATIENT)
Dept: ENDOCRINOLOGY | Facility: CLINIC | Age: 26
End: 2021-06-07

## 2021-06-07 RX ORDER — METHIMAZOLE 5 MG/1
5 TABLET ORAL DAILY
Qty: 30 TABLET | Refills: 5 | Status: SHIPPED | OUTPATIENT
Start: 2021-06-07 | End: 2021-12-10

## 2021-06-07 NOTE — TELEPHONE ENCOUNTER
PT CALLED STATING THAT HER METROPROLOL REQUIRES AUTHORIZATION. SHE DID NOT KNOW IF SHE NEEDED A REFILL OR IF IT REQUIRES A PA. PLEASE LOOK INTO THIS AND REACH OUT TO PT. SHE WAS VERY FRUSTRATED. THANK YOU

## 2021-06-07 NOTE — TELEPHONE ENCOUNTER
LOV:  04/27/21  Future visit:  07/19/21    PA was done, sent to [facility] for Metoprolol.

## 2021-07-14 ENCOUNTER — TELEPHONE (OUTPATIENT)
Dept: SURGERY | Facility: CLINIC | Age: 26
End: 2021-07-14

## 2021-07-14 NOTE — TELEPHONE ENCOUNTER
I left the patient a VM concerning missed appointment on 07/13/2021. I also mailed the patient a letter.

## 2021-09-02 ENCOUNTER — OFFICE VISIT (OUTPATIENT)
Dept: SURGERY | Facility: CLINIC | Age: 26
End: 2021-09-02

## 2021-09-02 VITALS
BODY MASS INDEX: 19.99 KG/M2 | SYSTOLIC BLOOD PRESSURE: 104 MMHG | TEMPERATURE: 97.7 F | OXYGEN SATURATION: 99 % | HEART RATE: 87 BPM | HEIGHT: 65 IN | DIASTOLIC BLOOD PRESSURE: 60 MMHG | WEIGHT: 120 LBS

## 2021-09-02 DIAGNOSIS — L72.3 SEBACEOUS CYST: Primary | ICD-10-CM

## 2021-09-02 PROCEDURE — 99213 OFFICE O/P EST LOW 20 MIN: CPT | Performed by: SURGERY

## 2021-09-10 ENCOUNTER — TELEPHONE (OUTPATIENT)
Dept: SURGERY | Facility: CLINIC | Age: 26
End: 2021-09-10

## 2021-09-10 NOTE — TELEPHONE ENCOUNTER
Patient called saying she forgot appointment. I rescheduled appointment and told her she will get a no show letter.   Implemented All Universal Safety Interventions:  [place] to call system. Call bell, personal items and telephone within reach. Instruct patient to call for assistance. Room bathroom lighting operational. Non-slip footwear when patient is off stretcher. Physically safe environment: no spills, clutter or unnecessary equipment. Stretcher in lowest position, wheels locked, appropriate side rails in place.

## 2021-09-21 ENCOUNTER — PROCEDURE VISIT (OUTPATIENT)
Dept: SURGERY | Facility: CLINIC | Age: 26
End: 2021-09-21

## 2021-09-21 DIAGNOSIS — L72.3 SEBACEOUS CYST: Primary | ICD-10-CM

## 2021-09-21 PROCEDURE — 11403 EXC TR-EXT B9+MARG 2.1-3CM: CPT | Performed by: SURGERY

## 2021-09-21 PROCEDURE — 11402 EXC TR-EXT B9+MARG 1.1-2 CM: CPT | Performed by: SURGERY

## 2021-09-21 PROCEDURE — 12032 INTMD RPR S/A/T/EXT 2.6-7.5: CPT | Performed by: SURGERY

## 2021-09-21 RX ORDER — ACYCLOVIR 400 MG/1
TABLET ORAL
COMMUNITY
Start: 2021-08-20 | End: 2022-03-02 | Stop reason: SDUPTHER

## 2021-10-28 ENCOUNTER — TELEMEDICINE (OUTPATIENT)
Dept: ENDOCRINOLOGY | Facility: CLINIC | Age: 26
End: 2021-10-28

## 2021-10-28 DIAGNOSIS — E05.90 HYPERTHYROIDISM: Primary | ICD-10-CM

## 2021-10-28 PROCEDURE — 99213 OFFICE O/P EST LOW 20 MIN: CPT | Performed by: INTERNAL MEDICINE

## 2021-10-28 NOTE — PROGRESS NOTES
Chief Complaint   Patient presents with   • Follow-up   • Hyperthyroidism        HPI   Gillian Marlow is a 26 y.o. female had concerns including Follow-up and Hyperthyroidism.      This was an audio and video enabled telemedicine encounter.    Patient reports no significant health changes in the interim since her last visit.  She continues on methimazole 5 mg daily and metoprolol 25 mg twice daily.  She reports she generally feels well and denies any acute changes.  She does report that she has given a lot of thought and does not want to know remain on methimazole long-term.  She is most strongly considering possible thyroidectomy.      The following portions of the patient's history were reviewed and updated as appropriate: allergies, current medications and past social history.    Review of Systems   Constitutional: Negative for unexpected weight gain and unexpected weight loss.   Cardiovascular: Negative for palpitations.   Gastrointestinal: Negative for constipation and diarrhea.      There were no vitals taken for this visit.     Physical Exam    General: well appearing, in no acute distress  Eyes:pupils equal and round  ENMT: normal external appearance of the ears and nose, thyroid gland appears enlarged  Neuro: alert, answers questions appropriately  Psych: appropriate mood and affect    Labs/Imaging  Labs dated 5/7/2021  TSI 3.69, elevated  Free T3 2.8, reference range 2-4.4  TSH 10.2, reference range 0.45-4.5  Free T4 0.71, reference range 0.82-1.77  LFTs, ANC normal    Diagnoses and all orders for this visit:    1. Hyperthyroidism (Primary)  -Secondary to Graves' disease, TSI elevated  -Currently taking methimazole 5 mg daily, reduced following most recent labs secondary to TSH elevation  -Patient taking metoprolol 25 mg twice daily  -Patient is clinically euthyroid  -Review potential options for long-term management of hyperthyroidism.  Patient does not want to stay on methimazole long-term and is  considering thyroidectomy.  Also reviewed possibility of weaning methimazole with goal of remission, discussed potential for recurrence of hyperthyroidism in this scenario.  Discussed requirement of lifelong thyroid hormone replacement if patient were to undergo thyroidectomy.  -Determine next steps after review of labs  -     TSH; Future  -     T4, Free; Future  -     T3, Free; Future     Patient was given orders for labs to be completed outside of [facility]. Patient instructed to contact clinic if they have not heard from this office regarding results within 1 week of lab draw to ensure that results were received by this office.    Return in about 4 months (around 2/28/2022). The patient was instructed to contact the clinic with any interval questions or concerns.    Dayan Shea MD   Endocrinologist    Please note that portions of this document were completed with a voice recognition program. Efforts were made to edit the dictations, but occasionally words are mis-transcribed.

## 2021-12-06 ENCOUNTER — LAB (OUTPATIENT)
Dept: LAB | Facility: HOSPITAL | Age: 26
End: 2021-12-06

## 2021-12-06 DIAGNOSIS — E05.90 HYPERTHYROIDISM: Primary | ICD-10-CM

## 2021-12-06 DIAGNOSIS — E05.90 HYPERTHYROIDISM: ICD-10-CM

## 2021-12-06 PROCEDURE — 80053 COMPREHEN METABOLIC PANEL: CPT | Performed by: INTERNAL MEDICINE

## 2021-12-06 PROCEDURE — 85007 BL SMEAR W/DIFF WBC COUNT: CPT | Performed by: INTERNAL MEDICINE

## 2021-12-06 PROCEDURE — 84439 ASSAY OF FREE THYROXINE: CPT | Performed by: INTERNAL MEDICINE

## 2021-12-06 PROCEDURE — 84445 ASSAY OF TSI GLOBULIN: CPT | Performed by: INTERNAL MEDICINE

## 2021-12-06 PROCEDURE — 85025 COMPLETE CBC W/AUTO DIFF WBC: CPT | Performed by: INTERNAL MEDICINE

## 2021-12-06 PROCEDURE — 84481 FREE ASSAY (FT-3): CPT | Performed by: INTERNAL MEDICINE

## 2021-12-06 PROCEDURE — 84443 ASSAY THYROID STIM HORMONE: CPT | Performed by: INTERNAL MEDICINE

## 2021-12-06 NOTE — TELEPHONE ENCOUNTER
"PT CALLED REQUESTING A REFILL OF METOPROLOL. SHE STATED SHE HAS TO HAVE LABS DRAWN PRIOR TO US FILLING THIS MED. WE SENT A LAB REQ BUT IT WAS SENT BACK AS \"RETURN TO SENDER\". I ENCOURAGED PT TO GO TO  IN [place] SOMETIME SOON TO HAVE HER LABS DRAWN.   "

## 2021-12-10 DIAGNOSIS — E05.90 HYPERTHYROIDISM: Primary | ICD-10-CM

## 2021-12-10 RX ORDER — METHIMAZOLE 5 MG/1
2.5 TABLET ORAL DAILY
Qty: 15 TABLET | Refills: 3 | Status: SHIPPED | OUTPATIENT
Start: 2021-12-10 | End: 2022-02-21

## 2022-01-04 ENCOUNTER — HOSPITAL ENCOUNTER (EMERGENCY)
Facility: HOSPITAL | Age: 27
Discharge: HOME | End: 2022-01-04
Attending: EMERGENCY MEDICINE | Admitting: EMERGENCY MEDICINE

## 2022-01-04 VITALS
BODY MASS INDEX: 20.66 KG/M2 | TEMPERATURE: 98.6 F | HEIGHT: 65 IN | RESPIRATION RATE: 14 BRPM | WEIGHT: 124 LBS | SYSTOLIC BLOOD PRESSURE: 127 MMHG | OXYGEN SATURATION: 100 % | HEART RATE: 75 BPM | DIASTOLIC BLOOD PRESSURE: 90 MMHG

## 2022-01-04 DIAGNOSIS — U07.1 COVID-19: Primary | ICD-10-CM

## 2022-01-04 LAB — SARS-COV-2 RNA PNL SPEC NAA+PROBE: DETECTED

## 2022-01-04 PROCEDURE — 99283 EMERGENCY DEPT VISIT LOW MDM: CPT

## 2022-01-04 PROCEDURE — C9803 HOPD COVID-19 SPEC COLLECT: HCPCS

## 2022-01-04 PROCEDURE — 87635 SARS-COV-2 COVID-19 AMP PRB: CPT | Performed by: PHYSICIAN ASSISTANT

## 2022-01-04 RX ORDER — ONDANSETRON 4 MG/1
4 TABLET, ORALLY DISINTEGRATING ORAL EVERY 8 HOURS PRN
Qty: 12 TABLET | Refills: 0 | OUTPATIENT
Start: 2022-01-04 | End: 2022-03-02

## 2022-01-05 NOTE — ED PROVIDER NOTES
Subjective   History of Present Illness   Patient is a 26-year-old female presenting to the ER with complaints of cold and flulike symptoms x2 days after positive exposure to Covid.  Patient states that she is just here to get a Covid test.  She has not been vaccinated for Covid.  She states she has had a mild cough, sore throat, and body aches.  She denies additional symptoms or complaints at this time.    Review of Systems   HENT: Positive for congestion and sore throat.    Respiratory: Positive for cough.    All other systems reviewed and are negative.      Past Medical History:   Diagnosis Date   • Thyroid disease        No Known Allergies    Past Surgical History:   Procedure Laterality Date   • NO PAST SURGERIES         Family History   Problem Relation Age of Onset   • Anemia Mother    • No Known Problems Father    • Thyroid disease Other        Social History     Socioeconomic History   • Marital status: Single   Tobacco Use   • Smoking status: Former Smoker     Quit date: 2019     Years since quittin.0   • Smokeless tobacco: Never Used   Vaping Use   • Vaping Use: Former   • Substances: Nicotine   • Devices: Pre-filled or refillable cartridge   Substance and Sexual Activity   • Alcohol use: Not Currently   • Drug use: Never   • Sexual activity: Defer           Objective   Physical Exam  Vitals and nursing note reviewed.   Constitutional:       General: She is not in acute distress.     Appearance: She is not toxic-appearing.   HENT:      Head: Normocephalic and atraumatic.      Right Ear: External ear normal.      Left Ear: External ear normal.      Nose: Nose normal.      Mouth/Throat:      Pharynx: Posterior oropharyngeal erythema present. No oropharyngeal exudate.   Eyes:      Extraocular Movements: Extraocular movements intact.      Conjunctiva/sclera: Conjunctivae normal.   Cardiovascular:      Rate and Rhythm: Normal rate.      Heart sounds: Normal heart sounds.   Pulmonary:      Effort:  Pulmonary effort is normal.      Breath sounds: Normal breath sounds.   Abdominal:      General: There is no distension.      Palpations: Abdomen is soft.   Musculoskeletal:         General: Normal range of motion.      Cervical back: Normal range of motion and neck supple.   Skin:     General: Skin is warm and dry.   Neurological:      General: No focal deficit present.      Mental Status: She is alert and oriented to person, place, and time.   Psychiatric:         Mood and Affect: Mood normal.         Behavior: Behavior normal.         Procedures           ED Course  ED Course as of 01/04/22 2111 Tue Jan 04, 2022 2106 COVID19(!!): Detected [AP]      ED Course User Index  [AP] Jenniffer Amaya, CLAUDY                                                 [facility]   Patient was evaluated in the ER for cold and flulike symptoms x2 days after a positive exposure to Covid.  She is hemodynamically stable, oxygen saturation 100%.  No acute distress.  Covid test was positive.  She was advised to isolate per CDC guidelines.  Supportive care was discussed.  Precautions were given for return to the ER for any new or worsening symptoms.    Final diagnoses:   COVID-19       ED Disposition  ED Disposition     ED Disposition Condition Comment    Discharge Stable           Ivania Carter APRN  [address] [place] [address] [address] [place] [address]  [phone]    Call   As needed    [facility] Emergency Department  [address] [address] [phone]  Go to   As needed, If symptoms worsen         Medication List      New Prescriptions    ondansetron ODT 4 MG disintegrating tablet  Commonly known as: ZOFRAN-ODT  Place 1 tablet on the tongue Every 8 (Eight) Hours As Needed for Nausea or Vomiting.           Where to Get Your Medications      These [facility] [place] - [address] [facility] - [phone]  - [phone] FX  [address] [place] [address]  [id number]    Phone: [phone]   · ondansetron ODT 4 MG disintegrating tablet          Jenniffer Amaya PA-C  01/04/22 2674

## 2022-01-28 ENCOUNTER — LAB (OUTPATIENT)
Dept: LAB | Facility: HOSPITAL | Age: 27
End: 2022-01-28

## 2022-01-28 DIAGNOSIS — E05.90 HYPERTHYROIDISM: ICD-10-CM

## 2022-01-28 LAB
T3FREE SERPL-MCNC: 2.61 PG/ML (ref 2–4.4)
T4 FREE SERPL-MCNC: 0.84 NG/DL (ref 0.93–1.7)
TSH SERPL DL<=0.05 MIU/L-ACNC: 5.12 UIU/ML (ref 0.27–4.2)

## 2022-01-28 PROCEDURE — 84439 ASSAY OF FREE THYROXINE: CPT | Performed by: INTERNAL MEDICINE

## 2022-01-28 PROCEDURE — 84481 FREE ASSAY (FT-3): CPT | Performed by: INTERNAL MEDICINE

## 2022-01-28 PROCEDURE — 84443 ASSAY THYROID STIM HORMONE: CPT | Performed by: INTERNAL MEDICINE

## 2022-02-21 ENCOUNTER — TELEMEDICINE (OUTPATIENT)
Dept: ENDOCRINOLOGY | Facility: CLINIC | Age: 27
End: 2022-02-21

## 2022-02-21 DIAGNOSIS — E05.00 GRAVES' DISEASE: Primary | ICD-10-CM

## 2022-02-21 PROCEDURE — 99213 OFFICE O/P EST LOW 20 MIN: CPT | Performed by: INTERNAL MEDICINE

## 2022-02-21 NOTE — PROGRESS NOTES
Chief Complaint   Patient presents with   • Follow-up   • Graves' Disease        HPI   Dinora Ferreira is a 26 y.o. female had concerns including Follow-up and Graves' Disease.      This was an audio and video enabled telemedicine encounter.    Patient reports no significant health changes in the interim since last visit.  She did complete labs approximately 2 weeks ago but had not yet reviewed her result letter and thus continues on methimazole 2.5 mg daily and metoprolol 25 mg twice daily.  She denies any adverse effects of these medications.  She denies any palpitations, diarrhea, heat or cold intolerance.    The following portions of the patient's history were reviewed and updated as appropriate: allergies, current medications and past social history.    Review of Systems   Cardiovascular: Negative for palpitations.   Gastrointestinal: Negative for constipation and diarrhea.   Endocrine: Negative for cold intolerance and heat intolerance.      There were no vitals taken for this visit.     Physical Exam    General: well appearing, in no acute distressd  Respiratory: no increased work of breathing  Neuro: alert, answers questions appropriately  Psych: appropriate mood and affect    Labs/Imaging  Results for DINORA FERREIRA (MRN [id number]) as of 2/21/2022 14:04   Ref. Range 1/28/2022 15:46   TSH Baseline Latest Ref Range: 0.270 - 4.200 uIU/mL 5.120 (H)   Free T4 Latest Ref Range: 0.93 - 1.70 ng/dL 0.84 (L)   T3, Free Latest Ref Range: 2.00 - 4.40 pg/mL 2.61       Diagnoses and all orders for this visit:    1. Graves' disease (Primary)  Most recent labs reviewed with patient  TSH improved but remains elevated, free T4 is low, patient advised to discontinue methimazole  Discussed potential for recurrence of hyperthyroidism once methimazole discontinued.  Patient was advised to contact in the interim between visits with any concerning symptoms.  Patient to continue metoprolol 25 mg twice daily, consider  discontinuation if repeat testing remains normal.  Patient was advised that next visit will need to be an in person evaluation.    Other orders  -     metoprolol tartrate (LOPRESSOR) 25 MG tablet; Take 1 tablet by mouth 2 (Two) Times a Day.  Dispense: 60 tablet; Refill: 3       Return in about 4 months (around 6/21/2022) for Next scheduled follow up. The patient was instructed to contact the clinic with any interval questions or concerns.    Dayan Shea MD   Endocrinologist    Dictated Utilizing Dragon Dictation

## 2022-06-23 ENCOUNTER — OFFICE VISIT (OUTPATIENT)
Dept: ENDOCRINOLOGY | Facility: CLINIC | Age: 27
End: 2022-06-23

## 2022-06-23 VITALS
WEIGHT: 111 LBS | DIASTOLIC BLOOD PRESSURE: 60 MMHG | SYSTOLIC BLOOD PRESSURE: 94 MMHG | BODY MASS INDEX: 18.49 KG/M2 | HEIGHT: 65 IN | HEART RATE: 90 BPM | OXYGEN SATURATION: 100 %

## 2022-06-23 DIAGNOSIS — E05.00 GRAVES' DISEASE: Primary | ICD-10-CM

## 2022-06-23 DIAGNOSIS — R00.2 PALPITATIONS: ICD-10-CM

## 2022-06-23 DIAGNOSIS — E04.0 SIMPLE GOITER: ICD-10-CM

## 2022-06-23 PROCEDURE — 99214 OFFICE O/P EST MOD 30 MIN: CPT | Performed by: INTERNAL MEDICINE

## 2022-06-23 NOTE — PROGRESS NOTES
"Chief Complaint   Patient presents with   • Graves' Disease        HPI   Gillian Marlow is a 26 y.o. female had concerns including Graves' Disease.      Patient reports no significant health changes in the interim since her last visit.  She did discontinue methimazole following last visit.  She denies any weight loss, diarrhea, anxiety.  She does continue on metoprolol 25 mg twice daily.  She does report she has had intermittent palpitations even predating hyperthyroidism would like to stay on this medication.    The following portions of the patient's history were reviewed and updated as appropriate: allergies, current medications and past social history.    Review of Systems   ROS was reviewed and verified. All other ROS negative.    BP 94/60 (BP Location: Left arm, Patient Position: Sitting, Cuff Size: Adult)   Pulse 90   Ht 165.1 cm (65\")   Wt 50.3 kg (111 lb)   SpO2 100%   BMI 18.47 kg/m²      Physical Exam      Constitutional:  well developed; well nourished  no acute distress  appears stated age   ENT/Thyroid: no thyromegaly  enlarged   Eyes: Conjunctiva: clear   Respiratory:  breathing is unlabored  clear to auscultation bilaterally   Cardiovascular:  regular rate and rhythm   Chest:  Not performed.   Abdomen: soft, non-tender; no masses   : Not performed.   Musculoskeletal: Not performed   Skin: dry and warm   Neuro: mental status, speech normal   Psych: mood and affect are within normal limits       Labs/Imaging   Latest Reference Range & Units 12/06/21 11:46 01/28/22 15:46   TSH Baseline 0.270 - 4.200 uIU/mL 12.300 (H) 5.120 (H)   Free T4 0.93 - 1.70 ng/dL 0.89 (L) 0.84 (L)   T3, Free 2.00 - 4.40 pg/mL 2.84 2.61   Thyroid Stimulating Immunoglobulin 0.00 - 0.55 IU/L 0.95 (H)    (H): Data is abnormally high  (L): Data is abnormally low    Diagnoses and all orders for this visit:    1. Graves' disease (Primary)  2.  Goiter  3. Palpitations  -Patient has history of hyperthyroidism secondary to Graves' " disease.  Methimazole was discontinued following last visit given elevated TSH, low free T4 on low-dose methimazole.  -Patient is clinically euthyroid.   -Plan to repeat labs to ensure stability.  Patient prefers to complete these locally at [facility].  Orders placed.  -We discussed potential for recurrence of hyperthyroidism in future.  Symptoms of hyperthyroidism were reviewed and patient will contact the clinic in the interim between visits with any concerning changes.  Discussed that if no symptoms are present, I would recommend she have TSH checked at least annually.  -Patient remains on metoprolol 25 mg twice daily, she does report palpitations which predated hyperthyroidism and would like to stay on this medication as symptoms are currently well controlled.  She denies any dizziness, lightheadedness or other medication.  -Patient does have diffuse enlargement of the thyroid gland.  She denies any compressive symptoms.  She will contact the clinic with any concerning changes.  -     TSH  -     T4, Free  -     T3, Free      Other orders  -     metoprolol tartrate (LOPRESSOR) 25 MG tablet; Take 1 tablet by mouth 2 (Two) Times a Day.  Dispense: 60 tablet; Refill: 5      Return in about 1 year (around 6/23/2023) for Next scheduled follow up. The patient was instructed to contact the clinic with any interval questions or concerns.    Dayan Shea MD   Endocrinologist    Dictated Utilizing Dragon Dictation

## 2022-07-15 ENCOUNTER — LAB (OUTPATIENT)
Dept: LAB | Facility: HOSPITAL | Age: 27
End: 2022-07-15

## 2022-07-15 LAB
T3FREE SERPL-MCNC: 10 PG/ML (ref 2–4.4)
T4 FREE SERPL-MCNC: 2.49 NG/DL (ref 0.93–1.7)
TSH SERPL DL<=0.05 MIU/L-ACNC: <0.005 UIU/ML (ref 0.27–4.2)

## 2022-07-15 PROCEDURE — 84439 ASSAY OF FREE THYROXINE: CPT | Performed by: INTERNAL MEDICINE

## 2022-07-15 PROCEDURE — 84481 FREE ASSAY (FT-3): CPT | Performed by: INTERNAL MEDICINE

## 2022-07-15 PROCEDURE — 84443 ASSAY THYROID STIM HORMONE: CPT | Performed by: INTERNAL MEDICINE

## 2022-07-18 RX ORDER — METHIMAZOLE 5 MG/1
5 TABLET ORAL DAILY
Qty: 90 TABLET | Refills: 1 | Status: SHIPPED | OUTPATIENT
Start: 2022-07-18 | End: 2022-11-17

## 2022-07-19 ENCOUNTER — TELEPHONE (OUTPATIENT)
Dept: ENDOCRINOLOGY | Facility: CLINIC | Age: 27
End: 2022-07-19

## 2022-07-19 NOTE — TELEPHONE ENCOUNTER
----- Message from Dayan Shea MD sent at 7/18/2022  8:08 AM EDT -----  TSH is suppressed and free thyroid hormones are elevated, indicating a need to resume methimazole. I would like her to restart 5 mg daily and repeat labs in 4 weeks. I will place orders for this. She will also need follow up in 3-4 months, please aid in scheduling.

## 2022-09-07 ENCOUNTER — TELEPHONE (OUTPATIENT)
Dept: ENDOCRINOLOGY | Facility: CLINIC | Age: 27
End: 2022-09-07

## 2022-09-07 NOTE — TELEPHONE ENCOUNTER
Called pt and she stated that she just started a new job and can't get time off at this point.    She is aware that you are out of the office today.    Please advise on the [facility] appt.

## 2022-09-07 NOTE — TELEPHONE ENCOUNTER
PATIENT STATES THAT SHE WILL BE UNABLE TO ATTEND UPCOMING APPT WITH DR BARR IN OFFICE AND WOULD LIKE TO KNOW IF [facility]    CALL BACK [phone]

## 2022-09-08 NOTE — TELEPHONE ENCOUNTER
Ok to change to mychart (please send to front office). Please advise patient that she has active orders for repeat labs on chart, it would be ideal if she can completed these 2-3 days prior to appointment so we may discuss results.

## 2022-09-15 ENCOUNTER — OFFICE VISIT (OUTPATIENT)
Dept: ENDOCRINOLOGY | Facility: CLINIC | Age: 27
End: 2022-09-15

## 2022-09-15 ENCOUNTER — LAB (OUTPATIENT)
Dept: LAB | Facility: HOSPITAL | Age: 27
End: 2022-09-15

## 2022-09-15 VITALS
WEIGHT: 114 LBS | OXYGEN SATURATION: 97 % | BODY MASS INDEX: 18.99 KG/M2 | HEART RATE: 74 BPM | SYSTOLIC BLOOD PRESSURE: 106 MMHG | HEIGHT: 65 IN | DIASTOLIC BLOOD PRESSURE: 60 MMHG

## 2022-09-15 DIAGNOSIS — E05.00 BASEDOW'S DISEASE: Primary | ICD-10-CM

## 2022-09-15 DIAGNOSIS — E05.90 HYPERTHYROIDISM: ICD-10-CM

## 2022-09-15 DIAGNOSIS — E05.00 GRAVES' DISEASE: Primary | ICD-10-CM

## 2022-09-15 PROCEDURE — 99213 OFFICE O/P EST LOW 20 MIN: CPT | Performed by: INTERNAL MEDICINE

## 2022-09-15 PROCEDURE — 84439 ASSAY OF FREE THYROXINE: CPT

## 2022-09-15 PROCEDURE — 84443 ASSAY THYROID STIM HORMONE: CPT

## 2022-09-15 PROCEDURE — 84481 FREE ASSAY (FT-3): CPT

## 2022-09-15 PROCEDURE — 36415 COLL VENOUS BLD VENIPUNCTURE: CPT

## 2022-09-15 NOTE — PROGRESS NOTES
"Chief Complaint   Patient presents with   • Graves' Disease        HPI   Gillian Marlow is a 26 y.o. female had concerns including Graves' Disease.      Patient reports symptoms have improved since restarting methimazole.  She is currently taking prednisone 5 mg daily and metoprolol 25 mg twice daily.  She has had some intermittent palpitations.  She denies weight changes, changes in bowel habits.  She does report that she would like to pursue definitive management with surgery once labs are normal.   She denies any compressive symptoms.    The following portions of the patient's history were reviewed and updated as appropriate: allergies, current medications and past social history.    Review of Systems   Constitutional: Negative for unexpected weight gain and unexpected weight loss.   HENT: Negative for trouble swallowing and voice change.    Cardiovascular: Positive for palpitations.   Gastrointestinal: Negative for constipation and diarrhea.   Endocrine: Negative for cold intolerance and heat intolerance.      /60 (BP Location: Left arm, Patient Position: Sitting, Cuff Size: Adult)   Pulse 74   Ht 165.1 cm (65\")   Wt 51.7 kg (114 lb)   SpO2 97%   BMI 18.97 kg/m²      Physical Exam      Constitutional:  well developed; well nourished  no acute distress   ENT/Thyroid: enlarged   Eyes: Conjunctiva: clear   Respiratory:  breathing is unlabored  clear to auscultation bilaterally   Cardiovascular:  regular rate and rhythm   Chest:  Not performed.   Abdomen: soft, non-tender; no masses   : Not performed.   Musculoskeletal: Not performed   Skin: not performed.   Neuro: mental status, speech normal   Psych: mood and affect are within normal limits       Labs/Imaging   Latest Reference Range & Units 12/06/21 11:46 01/28/22 15:46 07/15/22 17:25   TSH Baseline 0.270 - 4.200 uIU/mL 12.300 (H) 5.120 (H) <0.005 (L)   Free T4 0.93 - 1.70 ng/dL 0.89 (L) 0.84 (L) 2.49 (H)   T3, Free 2.00 - 4.40 pg/mL 2.84 2.61 10.00 " (H)   Thyroid Stimulating Immunoglobulin 0.00 - 0.55 IU/L 0.95 (H)     (H): Data is abnormally high  (L): Data is abnormally low    Diagnoses and all orders for this visit:    1. Graves' disease (Primary)  2. Hyperthyroidism  -     TSH  -     T4, Free  -     T3, Free  Patient currently taking methimazole 5 mg daily, she reports symptoms are improved from prior but she has continued palpitations.  Patient will continue metoprolol 25 mg twice daily.  Previously had recurrence of hyperthyroidism with weaning of methimazole, given this we will plan for definitive management once thyroid hormone normalized.  Tentatively plan to refer for thyroidectomy.  Discussed with patient that she will require lifelong thyroid hormone replacement after surgery.  Patient voiced understanding.  Plan to repeat thyroid function testing today and adjust methimazole as clinically indicated.  Symptoms of thyroid hormone dysregulation reviewed, patient will contact clinic in the interim between visits with any concerning changes.       Return in about 4 months (around 1/15/2023) for Next scheduled follow up. The patient was instructed to contact the clinic with any interval questions or concerns.    Dayan Shea MD   Endocrinologist    Dictated Utilizing Dragon Dictation

## 2022-09-16 DIAGNOSIS — E05.00 GRAVES' DISEASE: Primary | ICD-10-CM

## 2022-09-16 LAB
T3FREE SERPL-MCNC: 3.29 PG/ML (ref 2–4.4)
T4 FREE SERPL-MCNC: 1.03 NG/DL (ref 0.93–1.7)
TSH SERPL DL<=0.05 MIU/L-ACNC: 0.02 UIU/ML (ref 0.27–4.2)

## 2022-11-04 ENCOUNTER — LAB (OUTPATIENT)
Dept: LAB | Facility: HOSPITAL | Age: 27
End: 2022-11-04

## 2022-11-04 DIAGNOSIS — E05.00 GRAVES' DISEASE: ICD-10-CM

## 2022-11-04 LAB
T3FREE SERPL-MCNC: 2.42 PG/ML (ref 2–4.4)
T4 FREE SERPL-MCNC: 0.78 NG/DL (ref 0.93–1.7)
TSH SERPL DL<=0.05 MIU/L-ACNC: 0.97 UIU/ML (ref 0.27–4.2)

## 2022-11-04 PROCEDURE — 84443 ASSAY THYROID STIM HORMONE: CPT

## 2022-11-04 PROCEDURE — 84439 ASSAY OF FREE THYROXINE: CPT

## 2022-11-04 PROCEDURE — 84481 FREE ASSAY (FT-3): CPT

## 2022-11-17 ENCOUNTER — TELEPHONE (OUTPATIENT)
Dept: ENDOCRINOLOGY | Facility: CLINIC | Age: 27
End: 2022-11-17

## 2022-11-17 DIAGNOSIS — E05.00 GRAVES' DISEASE: ICD-10-CM

## 2022-11-17 RX ORDER — METHIMAZOLE 5 MG/1
TABLET ORAL
Qty: 90 TABLET | Refills: 1 | Status: SHIPPED | OUTPATIENT
Start: 2022-11-17 | End: 2023-01-20

## 2022-11-17 NOTE — TELEPHONE ENCOUNTER
Pt returned nurse's call regarding dr camp order. Please call patient back at    [phone]    [facility] pharmacy:    Phone: [phone] Fax: [phone]

## 2022-12-22 ENCOUNTER — TELEMEDICINE (OUTPATIENT)
Dept: ENDOCRINOLOGY | Facility: CLINIC | Age: 27
End: 2022-12-22

## 2022-12-22 DIAGNOSIS — E05.00 GRAVES' DISEASE: ICD-10-CM

## 2022-12-22 DIAGNOSIS — E05.90 HYPERTHYROIDISM: Primary | ICD-10-CM

## 2022-12-22 PROCEDURE — 99213 OFFICE O/P EST LOW 20 MIN: CPT | Performed by: INTERNAL MEDICINE

## 2022-12-22 NOTE — PROGRESS NOTES
Chief Complaint   Patient presents with   • Hyperthyroidism        HPI   Gillian Marlow is a 27 y.o. female had concerns including Hyperthyroidism.      This was an audio and video enabled telemedicine encounter.    Patient reports that significant health changes since her last visit.  She reports occasional palpitations but states this is rare.  She continues on methimazole 5 mg 6 days/week and metoprolol 25 mg daily.    The following portions of the patient's history were reviewed and updated as appropriate: allergies, current medications and past social history.    Review of Systems   Constitutional: Negative for unexpected weight gain and unexpected weight loss.   Cardiovascular: Positive for palpitations.   Gastrointestinal: Negative for constipation and diarrhea.        There were no vitals taken for this visit.     Physical Exam    General: well appearing, in no acute distress  Eyes: Proptosis noted  ENMT: Thyroid enlarged    Neuro: alert, answers questions appropriately  Psych: appropriate mood and affect    Labs/Imaging   Latest Reference Range & Units 11/04/22 09:26   TSH Baseline 0.270 - 4.200 uIU/mL 0.967   Free T4 0.93 - 1.70 ng/dL 0.78 (L)   T3, Free 2.00 - 4.40 pg/mL 2.42   (L): Data is abnormally low    Diagnoses and all orders for this visit:    1. Hyperthyroidism (Primary)  -     TSH; Future  -     T4, Free; Future  -     T3, Free; Future  Secondary to Graves' disease  Patient previously developed recurrence of hyperthyroidism after weaning off methimazole currently taking methimazole 5 mg 6 days/week  Patient reports occasional palpitations, otherwise clinically euthyroid  Plan to repeat labs today and adjust medication as clinically indicated  Discussed long-term plans for management of hyperthyroidism.  I would not recommend LEGGETT as definitive management given size of goiter and concurrent eye disease.  Discussed option of thyroidectomy for definitive management.  Reviewed how long-term use of  methimazole may affect potential pregnancy in the future.  Reviewed that this medication is unknown teratogen.  We will continue discussions at future appointments.       Other orders  -     metoprolol tartrate (LOPRESSOR) 25 MG tablet; Take 1 tablet by mouth 2 (Two) Times a Day.  Dispense: 180 tablet; Refill: 1         Return in about 4 months (around 4/22/2023) for Next scheduled follow up. The patient was instructed to contact the clinic with any interval questions or concerns.    Dayan Shea MD   Endocrinologist    Dictated Utilizing Dragon Dictation

## 2023-01-18 ENCOUNTER — LAB (OUTPATIENT)
Dept: LAB | Facility: HOSPITAL | Age: 28
End: 2023-01-18
Payer: MEDICAID

## 2023-01-18 DIAGNOSIS — E05.90 HYPERTHYROIDISM: ICD-10-CM

## 2023-01-18 PROCEDURE — 84481 FREE ASSAY (FT-3): CPT

## 2023-01-18 PROCEDURE — 84443 ASSAY THYROID STIM HORMONE: CPT

## 2023-01-18 PROCEDURE — 84439 ASSAY OF FREE THYROXINE: CPT

## 2023-01-19 LAB
T3FREE SERPL-MCNC: 2.54 PG/ML (ref 2–4.4)
T4 FREE SERPL-MCNC: 0.82 NG/DL (ref 0.93–1.7)
TSH SERPL DL<=0.05 MIU/L-ACNC: 2.82 UIU/ML (ref 0.27–4.2)

## 2023-01-20 RX ORDER — METHIMAZOLE 5 MG/1
TABLET ORAL
Qty: 90 TABLET | Refills: 1
Start: 2023-01-20 | End: 2023-03-09 | Stop reason: SDUPTHER

## 2023-01-24 ENCOUNTER — TELEPHONE (OUTPATIENT)
Dept: ENDOCRINOLOGY | Facility: CLINIC | Age: 28
End: 2023-01-24
Payer: MEDICAID

## 2023-01-24 NOTE — TELEPHONE ENCOUNTER
Pt returned our call for lab results. She said she has to check work schedule then will call to make appt.      Call pt can leave detailed message    [phone]

## 2023-03-06 ENCOUNTER — TELEPHONE (OUTPATIENT)
Dept: ENDOCRINOLOGY | Facility: CLINIC | Age: 28
End: 2023-03-06
Payer: MEDICAID

## 2023-03-06 DIAGNOSIS — E05.90 HYPERTHYROIDISM: Primary | ICD-10-CM

## 2023-03-06 NOTE — TELEPHONE ENCOUNTER
PATIENT RETURNED CALL TO JOSE JUAN. ADVISED THAT JOSE JUAN IS NOT IN OFFICE THIS AFTERNOON. PATIENT WOULD LIKE TO HAVE LAB ORDERS PLACES SO THAT SHE CAN HAVE THESE DRAWN TOMORROW MORNING IN [place]. PATIENT STATES THAT SHE IS STILL TAKING THE METOPROLOL 25 MG TWICE DAILY.

## 2023-03-06 NOTE — TELEPHONE ENCOUNTER
I called the pt and she stated she has noticed an increase of heart palpitations the last week. She is taking the Methimazole 5 mg Monday thru Friday and not on the weekends.    Her medication was changed on 1-18-23 (see lab result message) from daily to 5 days a week.    Please advise.

## 2023-03-06 NOTE — TELEPHONE ENCOUNTER
It sounds like we should check thyroid labs to see where her levels are on the new dose-- will she have these completed through [facility] or dose she need a lab slip mailed?    Is she taking the Metoprolol 25 mg twice daily Dr. Shea added last appointment?  This may help with the palpitations.  Let me know thanks.  RT

## 2023-03-06 NOTE — TELEPHONE ENCOUNTER
Pt called states she has been having heart palpitations causing patient to take Methimazole 5 mg more than once a day. This has been going on for a week now. Pt stated Dr Shea wanted pt to reach out if this was happening. Pt last f/u 09/15/22 pt next f/u 06/29/23. Pt is aware Dr Shea is out all week. Please contact pt

## 2023-03-08 ENCOUNTER — LAB (OUTPATIENT)
Dept: LAB | Facility: HOSPITAL | Age: 28
End: 2023-03-08
Payer: MEDICAID

## 2023-03-08 DIAGNOSIS — E05.90 HYPERTHYROIDISM: ICD-10-CM

## 2023-03-08 LAB
T4 FREE SERPL-MCNC: 0.74 NG/DL (ref 0.93–1.7)
TSH SERPL DL<=0.05 MIU/L-ACNC: 3.32 UIU/ML (ref 0.27–4.2)

## 2023-03-08 PROCEDURE — 84443 ASSAY THYROID STIM HORMONE: CPT

## 2023-03-08 PROCEDURE — 36415 COLL VENOUS BLD VENIPUNCTURE: CPT

## 2023-03-08 PROCEDURE — 84439 ASSAY OF FREE THYROXINE: CPT

## 2023-03-09 ENCOUNTER — TELEPHONE (OUTPATIENT)
Dept: ENDOCRINOLOGY | Facility: CLINIC | Age: 28
End: 2023-03-09
Payer: MEDICAID

## 2023-03-09 DIAGNOSIS — E05.00 GRAVES' DISEASE: ICD-10-CM

## 2023-03-09 RX ORDER — METHIMAZOLE 5 MG/1
2.5 TABLET ORAL DAILY
Qty: 45 TABLET | Refills: 1 | Status: SHIPPED | OUTPATIENT
Start: 2023-03-09

## 2023-03-09 NOTE — TELEPHONE ENCOUNTER
Called and spoke with patient.  Advised her labs indicate we need to reduce the methimazole dose.  She reports she has been taking 5mg Monday-Friday but occasionaly takes an additional pill if she has the palpitations and this seems to help.  She has also been taking metoprolol 25mg BID  We will reduce her methimazole to 5 mg 1/2 tablet daily (2.5mg daily).  I advised her Dr. Shea would likely want to repeat labs prior to her late June appt, but I would leave this up to her.    We discussed avoiding caffeine.  She is notes the palpitations on occasion and they seem more frequent recently.  She has not checked her HR, but does not feel her heart is racing regularly. She will try to check her HR-- increasing metoprolol may be an option.   We also discussed checking in with checking in with her PCP regarding palpitations.  Advised the thyroid being low is not likely the cause.  We discussed they may want to consider further evaluation or have her wear a monitor if these are bothersome and persistent.  RT

## 2023-03-14 DIAGNOSIS — E05.00 GRAVES' DISEASE: Primary | ICD-10-CM

## 2023-03-15 ENCOUNTER — TELEPHONE (OUTPATIENT)
Dept: ENDOCRINOLOGY | Facility: CLINIC | Age: 28
End: 2023-03-15

## 2023-05-26 ENCOUNTER — LAB (OUTPATIENT)
Dept: LAB | Facility: HOSPITAL | Age: 28
End: 2023-05-26
Payer: MEDICAID

## 2023-05-26 DIAGNOSIS — E05.00 GRAVES' DISEASE: ICD-10-CM

## 2023-05-26 LAB
T3FREE SERPL-MCNC: 2.44 PG/ML (ref 2–4.4)
T4 FREE SERPL-MCNC: 0.79 NG/DL (ref 0.93–1.7)
TSH SERPL DL<=0.05 MIU/L-ACNC: 2.45 UIU/ML (ref 0.27–4.2)

## 2023-05-26 PROCEDURE — 84439 ASSAY OF FREE THYROXINE: CPT

## 2023-05-26 PROCEDURE — 84481 FREE ASSAY (FT-3): CPT

## 2023-05-26 PROCEDURE — 84443 ASSAY THYROID STIM HORMONE: CPT

## 2023-05-30 DIAGNOSIS — E05.00 GRAVES' DISEASE: ICD-10-CM

## 2023-05-30 RX ORDER — METHIMAZOLE 5 MG/1
2.5 TABLET ORAL DAILY
Qty: 45 TABLET | Refills: 0 | Status: SHIPPED | OUTPATIENT
Start: 2023-05-30

## 2023-05-30 NOTE — TELEPHONE ENCOUNTER
Caller: Gillian Marlow    Relationship: Self    Best call back number: [phone]    Requested Prescriptions:   Requested Prescriptions     Pending Prescriptions Disp Refills   • metoprolol tartrate (LOPRESSOR) 25 MG tablet 180 tablet 1     Sig: Take 1 tablet by mouth 2 (Two) Times a Day.   • methIMAzole (TAPAZOLE) 5 MG tablet 45 tablet 1     Sig: Take 0.5 tablets by mouth Daily.        Pharmacy where request should be sent:      Last office visit with prescribing clinician: 9/15/2022   Last telemedicine visit with prescribing clinician: 12/22/2022   Next office visit with prescribing clinician: 6/29/2023     Additional details provided by patient: PATIENT NEEDS TO HAVE BOTH METOPROLOL TARTRATE AND METHLMAZOLE REFILLED. PATIENT IS COMPLETELY OUT OF THE METHLMAZOLE THE PATIENT IS COMPLETELY OUT OF. IF YOU HAVE ANY QUESTIONS PLEASE CALL THE PATIENT. IF NOT AVAILABLE, PLEASE LEAVE  A MESSAGE.    Does the patient have less than a 3 day supply:  [x] Yes  [] No    Would you like a call back once the refill request has been completed: [] Yes [x] No    If the office needs to give you a call back, can they leave a voicemail: [] Yes [x] No    Shagufta Farr Rep   05/30/23 09:30 EDT

## 2023-05-30 NOTE — TELEPHONE ENCOUNTER
Rx Refill Note    Requested Prescriptions     Pending Prescriptions Disp Refills   • metoprolol tartrate (LOPRESSOR) 25 MG tablet 180 tablet 1     Sig: Take 1 tablet by mouth 2 (Two) Times a Day.   • methIMAzole (TAPAZOLE) 5 MG tablet 45 tablet 1     Sig: Take 0.5 tablets by mouth Daily.        Last office visit with prescribing clinician: 9/15/2022       Next office visit with prescribing clinician: 6/29/2023     {    Zonia Kelly MA  05/30/23, 11:01 EDT

## 2023-09-25 ENCOUNTER — LAB (OUTPATIENT)
Dept: LAB | Facility: HOSPITAL | Age: 28
End: 2023-09-25
Payer: COMMERCIAL

## 2023-09-25 DIAGNOSIS — E05.90 HYPERTHYROIDISM: ICD-10-CM

## 2023-09-25 LAB
T3FREE SERPL-MCNC: 2.9 PG/ML (ref 2–4.4)
T4 FREE SERPL-MCNC: 1.08 NG/DL (ref 0.93–1.7)
TSH SERPL DL<=0.05 MIU/L-ACNC: 4.79 UIU/ML (ref 0.27–4.2)

## 2023-09-25 PROCEDURE — 84481 FREE ASSAY (FT-3): CPT

## 2023-09-25 PROCEDURE — 84443 ASSAY THYROID STIM HORMONE: CPT

## 2023-09-25 PROCEDURE — 84439 ASSAY OF FREE THYROXINE: CPT

## 2023-09-26 NOTE — TELEPHONE ENCOUNTER
Rx Refill Note    Requested Prescriptions     Pending Prescriptions Disp Refills    metoprolol tartrate (LOPRESSOR) 25 MG tablet 180 tablet 0     Sig: Take 1 tablet by mouth 2 (Two) Times a Day.        Last office visit with prescribing clinician: 6/29/2023       Next office visit with prescribing clinician: 12/29/2023     {    Zonia Kelly MA  09/26/23, 08:26 EDT

## 2023-12-28 ENCOUNTER — TELEPHONE (OUTPATIENT)
Dept: ENDOCRINOLOGY | Facility: CLINIC | Age: 28
End: 2023-12-28
Payer: COMMERCIAL

## 2023-12-28 NOTE — TELEPHONE ENCOUNTER
Patient would like to know if she can change her appt to a video visit. Appt is scheduled 12/29/2023 at 3:15 pm.

## 2023-12-29 ENCOUNTER — TELEMEDICINE (OUTPATIENT)
Dept: ENDOCRINOLOGY | Facility: CLINIC | Age: 28
End: 2023-12-29
Payer: COMMERCIAL

## 2023-12-29 DIAGNOSIS — E05.90 HYPERTHYROIDISM: Primary | ICD-10-CM

## 2023-12-29 PROCEDURE — 99213 OFFICE O/P EST LOW 20 MIN: CPT | Performed by: INTERNAL MEDICINE

## 2023-12-29 NOTE — PROGRESS NOTES
Chief Complaint   Patient presents with    Hyperthyroidism        HPI   Gillian Marlow is a 28 y.o. female had concerns including Hyperthyroidism.      This was an audio and video enabled telemedicine encounter.     Patient reports that she has been off methimazole for approximately 1 month.  She denies any recurrence of palpitations, diarrhea, weight loss.  She has continued on metoprolol 25 mg twice daily.    The following portions of the patient's history were reviewed and updated as appropriate: allergies, current medications, and past social history.    Review of Systems   Constitutional:  Negative for unexpected weight gain.   Cardiovascular:  Negative for palpitations.   Gastrointestinal:  Negative for diarrhea.      There were no vitals taken for this visit.     Physical Exam    General: well appearing, in no acute distress  ENMT: Thyroid gland is enlarged  Neuro: alert, answers questions appropriately  Psych: appropriate mood and affect     Labs/Imaging   Latest Reference Range & Units 09/25/23 07:15   TSH Baseline 0.270 - 4.200 uIU/mL 4.790 (H)   Free T4 0.93 - 1.70 ng/dL 1.08   T3, Free 2.00 - 4.40 pg/mL 2.90   (H): Data is abnormally high    Diagnoses and all orders for this visit:    1. Hyperthyroidism (Primary)  -     TSH; Future  -     T4, Free; Future  -     T3, Free; Future  Hypothyroidism secondary to Graves' disease  Patient discontinued methimazole approximately 1 month ago.  She is currently clinically euthyroid but previously had recurrence of hyperthyroidism with attempt to wean methimazole.  Orders placed to repeat thyroid function testing  Symptoms of hyperthyroidism were reviewed and patient will contact the clinic in the interim between visits with any concerning changes.  Patient will continue on metoprolol 25 mg twice daily.    Other orders  -     metoprolol tartrate (LOPRESSOR) 25 MG tablet; Take 1 tablet by mouth 2 (Two) Times a Day.  Dispense: 180 tablet; Refill: 1         Return in  about 6 months (around 6/29/2024). The patient was instructed to contact the clinic with any interval questions or concerns.    Electronically signed by: Dayan Shea MD   Endocrinologist    Dictated Utilizing Dragon Dictation

## 2024-02-27 ENCOUNTER — LAB (OUTPATIENT)
Dept: LAB | Facility: HOSPITAL | Age: 29
End: 2024-02-27
Payer: COMMERCIAL

## 2024-02-27 DIAGNOSIS — E05.90 HYPERTHYROIDISM: ICD-10-CM

## 2024-02-27 LAB
T3FREE SERPL-MCNC: 6.16 PG/ML (ref 2–4.4)
T4 FREE SERPL-MCNC: 2.03 NG/DL (ref 0.93–1.7)
TSH SERPL DL<=0.05 MIU/L-ACNC: <0.005 UIU/ML (ref 0.27–4.2)

## 2024-02-27 PROCEDURE — 84443 ASSAY THYROID STIM HORMONE: CPT

## 2024-02-27 PROCEDURE — 84439 ASSAY OF FREE THYROXINE: CPT

## 2024-02-27 PROCEDURE — 84481 FREE ASSAY (FT-3): CPT

## 2024-02-28 DIAGNOSIS — E05.00 GRAVES' DISEASE: ICD-10-CM

## 2024-02-28 RX ORDER — METHIMAZOLE 5 MG/1
5 TABLET ORAL DAILY
Qty: 30 TABLET | Refills: 3 | Status: SHIPPED | OUTPATIENT
Start: 2024-02-28

## 2024-06-07 ENCOUNTER — OFFICE VISIT (OUTPATIENT)
Dept: INTERNAL MEDICINE | Facility: CLINIC | Age: 29
End: 2024-06-07
Payer: COMMERCIAL

## 2024-06-07 VITALS
DIASTOLIC BLOOD PRESSURE: 64 MMHG | SYSTOLIC BLOOD PRESSURE: 102 MMHG | HEART RATE: 63 BPM | BODY MASS INDEX: 19.99 KG/M2 | OXYGEN SATURATION: 100 % | WEIGHT: 120 LBS | HEIGHT: 65 IN | TEMPERATURE: 98.7 F

## 2024-06-07 DIAGNOSIS — Z13.228 SCREENING FOR ENDOCRINE, METABOLIC AND IMMUNITY DISORDER: ICD-10-CM

## 2024-06-07 DIAGNOSIS — L73.2 HIDRADENITIS SUPPURATIVA OF RIGHT AXILLA: ICD-10-CM

## 2024-06-07 DIAGNOSIS — Z13.29 SCREENING FOR ENDOCRINE, METABOLIC AND IMMUNITY DISORDER: ICD-10-CM

## 2024-06-07 DIAGNOSIS — Z13.0 SCREENING FOR DISORDER OF BLOOD AND BLOOD-FORMING ORGANS: ICD-10-CM

## 2024-06-07 DIAGNOSIS — Z76.89 ENCOUNTER TO ESTABLISH CARE: Primary | ICD-10-CM

## 2024-06-07 DIAGNOSIS — E05.90 HYPERTHYROIDISM: ICD-10-CM

## 2024-06-07 DIAGNOSIS — F41.9 ANXIETY: ICD-10-CM

## 2024-06-07 DIAGNOSIS — F33.1 MODERATE EPISODE OF RECURRENT MAJOR DEPRESSIVE DISORDER: ICD-10-CM

## 2024-06-07 DIAGNOSIS — B00.9 HSV-2 (HERPES SIMPLEX VIRUS 2) INFECTION: ICD-10-CM

## 2024-06-07 DIAGNOSIS — Z83.2 FAMILY HISTORY OF AUTOIMMUNE DISORDER: ICD-10-CM

## 2024-06-07 DIAGNOSIS — L73.2 HIDRADENITIS SUPPURATIVA OF LEFT AXILLA: ICD-10-CM

## 2024-06-07 DIAGNOSIS — T74.31XA ADULT SUBJECT TO EMOTIONAL ABUSE, INITIAL ENCOUNTER: ICD-10-CM

## 2024-06-07 DIAGNOSIS — Z13.0 SCREENING FOR ENDOCRINE, METABOLIC AND IMMUNITY DISORDER: ICD-10-CM

## 2024-06-07 RX ORDER — SERTRALINE HYDROCHLORIDE 25 MG/1
25 TABLET, FILM COATED ORAL DAILY
Qty: 30 TABLET | Refills: 1 | Status: SHIPPED | OUTPATIENT
Start: 2024-06-07

## 2024-06-07 RX ORDER — ACYCLOVIR 400 MG/1
400 TABLET ORAL 2 TIMES DAILY
Qty: 180 TABLET | Refills: 1 | Status: SHIPPED | OUTPATIENT
Start: 2024-06-07
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
## 2024-06-18 ENCOUNTER — OFFICE VISIT (OUTPATIENT)
Dept: ENDOCRINOLOGY | Facility: CLINIC | Age: 29
End: 2024-06-18
Payer: COMMERCIAL

## 2024-06-18 VITALS
BODY MASS INDEX: 19.99 KG/M2 | SYSTOLIC BLOOD PRESSURE: 106 MMHG | OXYGEN SATURATION: 100 % | DIASTOLIC BLOOD PRESSURE: 60 MMHG | HEIGHT: 65 IN | HEART RATE: 64 BPM | WEIGHT: 120 LBS

## 2024-06-18 DIAGNOSIS — E05.00 GRAVES' DISEASE: ICD-10-CM

## 2024-06-18 DIAGNOSIS — R79.89 ABNORMAL CBC: ICD-10-CM

## 2024-06-18 DIAGNOSIS — E05.90 HYPERTHYROIDISM: Primary | ICD-10-CM

## 2024-06-18 DIAGNOSIS — E01.0 THYROMEGALY: ICD-10-CM

## 2024-06-18 LAB
DEPRECATED RDW RBC AUTO: 37.7 FL (ref 37–54)
ERYTHROCYTE [DISTWIDTH] IN BLOOD BY AUTOMATED COUNT: 17.6 % (ref 12.3–15.4)
HCT VFR BLD AUTO: 33.7 % (ref 34–46.6)
HGB BLD-MCNC: 9.2 G/DL (ref 12–15.9)
MCH RBC QN AUTO: 16.9 PG (ref 26.6–33)
MCHC RBC AUTO-ENTMCNC: 27.3 G/DL (ref 31.5–35.7)
MCV RBC AUTO: 62.1 FL (ref 79–97)
PLATELET # BLD AUTO: 339 10*3/MM3 (ref 140–450)
PMV BLD AUTO: 9.9 FL (ref 6–12)
RBC # BLD AUTO: 5.43 10*6/MM3 (ref 3.77–5.28)
WBC NRBC COR # BLD AUTO: 4.51 10*3/MM3 (ref 3.4–10.8)

## 2024-06-18 PROCEDURE — 85027 COMPLETE CBC AUTOMATED: CPT | Performed by: INTERNAL MEDICINE

## 2024-06-18 PROCEDURE — 99213 OFFICE O/P EST LOW 20 MIN: CPT | Performed by: INTERNAL MEDICINE

## 2024-06-18 PROCEDURE — 36415 COLL VENOUS BLD VENIPUNCTURE: CPT | Performed by: INTERNAL MEDICINE

## 2024-06-18 PROCEDURE — 84443 ASSAY THYROID STIM HORMONE: CPT | Performed by: INTERNAL MEDICINE

## 2024-06-18 PROCEDURE — 80053 COMPREHEN METABOLIC PANEL: CPT | Performed by: INTERNAL MEDICINE

## 2024-06-18 PROCEDURE — 84439 ASSAY OF FREE THYROXINE: CPT | Performed by: INTERNAL MEDICINE

## 2024-06-18 PROCEDURE — 84481 FREE ASSAY (FT-3): CPT | Performed by: INTERNAL MEDICINE

## 2024-06-18 RX ORDER — METHIMAZOLE 5 MG/1
5 TABLET ORAL DAILY
Qty: 90 TABLET | Refills: 1 | Status: SHIPPED | OUTPATIENT
Start: 2024-06-18 | End: 2024-06-20

## 2024-06-18 NOTE — PROGRESS NOTES
"Chief Complaint   Patient presents with    Hyperthyroidism        HPI   Gillian Marlow is a 28 y.o. female had concerns including Hyperthyroidism.      Patient had recurrence of hyperthyroidism following discontinuation of methimazole.  She is currently taking 5 mg daily.  She denies any adverse effect of this medication.  No recent weight changes, changes in bowel habits, anxiety.  She does report desire to move forward with surgical consultation.    The following portions of the patient's history were reviewed and updated as appropriate: allergies, current medications, and past social history.    Review of Systems   Constitutional:  Negative for unexpected weight gain and unexpected weight loss.   Cardiovascular:  Negative for palpitations.       /60 (BP Location: Right arm, Patient Position: Sitting, Cuff Size: Adult)   Pulse 64   Ht 165.1 cm (65\")   Wt 54.4 kg (120 lb)   SpO2 100%   BMI 19.97 kg/m²      Physical Exam      Constitutional:  well developed; well nourished  no acute distress  appears stated age   ENT/Thyroid: enlarged   Eyes: Conjunctiva: clear   Respiratory:  breathing is unlabored  clear to auscultation bilaterally   Cardiovascular:  regular rate and rhythm   Chest:  Not performed.   Abdomen: Not performed.   : Not performed.   Musculoskeletal: Not performed   Skin: not performed.   Neuro: mental status, speech normal   Psych: mood and affect are within normal limits       Labs/Imaging   Latest Reference Range & Units 02/27/24 09:38   TSH Baseline 0.270 - 4.200 uIU/mL <0.005 (L)   Free T4 0.93 - 1.70 ng/dL 2.03 (H)   T3, Free 2.00 - 4.40 pg/mL 6.16 (H)   (L): Data is abnormally low  (H): Data is abnormally high    Diagnoses and all orders for this visit:    1. Hyperthyroidism (Primary)  -     TSH; Future  -     T4, Free; Future  -     T3, Free; Future  -     Comprehensive Metabolic Panel; Future  -     CBC (No Diff); Future  -     methIMAzole (TAPAZOLE) 5 MG tablet; Take 1 tablet by " mouth Daily.  Dispense: 90 tablet; Refill: 1  2. Graves' disease  3. Thyromegaly    Patient has hyperthyroidism secondary to Graves' disease.  She was previously hesitant to consider definitive management but following most recent recurrence of hyperthyroidism reports desire to move forward with surgical consultation.  We did discuss goal of normalizing thyroid function testing prior to surgery, she is currently taking methimazole 5 mg daily and will have labs updated today.  We did discuss need for lifelong thyroid hormone supplementation following surgery, patient voiced understanding.  Referral will be placed for surgical consultation.    Other orders  -     metoprolol tartrate (LOPRESSOR) 25 MG tablet; Take 1 tablet by mouth 2 (Two) Times a Day.  Dispense: 180 tablet; Refill: 1         No follow-ups on file. The patient was instructed to contact the clinic with any interval questions or concerns.    Electronically signed by: Dayan Shea MD   Endocrinologist    Dictated Utilizing Dragon Dictation

## 2024-06-19 LAB
ALBUMIN SERPL-MCNC: 4.3 G/DL (ref 3.5–5.2)
ALBUMIN/GLOB SERPL: 1.3 G/DL
ALP SERPL-CCNC: 42 U/L (ref 39–117)
ALT SERPL W P-5'-P-CCNC: 10 U/L (ref 1–33)
ANION GAP SERPL CALCULATED.3IONS-SCNC: 8 MMOL/L (ref 5–15)
AST SERPL-CCNC: 14 U/L (ref 1–32)
BILIRUB SERPL-MCNC: 0.3 MG/DL (ref 0–1.2)
BUN SERPL-MCNC: 5 MG/DL (ref 6–20)
BUN/CREAT SERPL: 10 (ref 7–25)
CALCIUM SPEC-SCNC: 8.9 MG/DL (ref 8.6–10.5)
CHLORIDE SERPL-SCNC: 104 MMOL/L (ref 98–107)
CO2 SERPL-SCNC: 23 MMOL/L (ref 22–29)
CREAT SERPL-MCNC: 0.5 MG/DL (ref 0.57–1)
EGFRCR SERPLBLD CKD-EPI 2021: 131.2 ML/MIN/1.73
GLOBULIN UR ELPH-MCNC: 3.3 GM/DL
GLUCOSE SERPL-MCNC: 76 MG/DL (ref 65–99)
POTASSIUM SERPL-SCNC: 4.3 MMOL/L (ref 3.5–5.2)
PROT SERPL-MCNC: 7.6 G/DL (ref 6–8.5)
SODIUM SERPL-SCNC: 135 MMOL/L (ref 136–145)
T3FREE SERPL-MCNC: 5.59 PG/ML (ref 2–4.4)
T4 FREE SERPL-MCNC: 1.53 NG/DL (ref 0.92–1.68)
TSH SERPL DL<=0.05 MIU/L-ACNC: <0.005 UIU/ML (ref 0.27–4.2)

## 2024-06-20 RX ORDER — METHIMAZOLE 5 MG/1
7.5 TABLET ORAL DAILY
Qty: 135 TABLET | Refills: 1 | Status: SHIPPED | OUTPATIENT
Start: 2024-06-20

## 2024-07-16 ENCOUNTER — OFFICE VISIT (OUTPATIENT)
Dept: INTERNAL MEDICINE | Facility: CLINIC | Age: 29
End: 2024-07-16
Payer: COMMERCIAL

## 2024-07-16 VITALS
HEIGHT: 65 IN | OXYGEN SATURATION: 97 % | DIASTOLIC BLOOD PRESSURE: 60 MMHG | TEMPERATURE: 99.1 F | WEIGHT: 119 LBS | SYSTOLIC BLOOD PRESSURE: 108 MMHG | HEART RATE: 71 BPM | BODY MASS INDEX: 19.83 KG/M2

## 2024-07-16 DIAGNOSIS — Z12.4 SCREENING FOR CERVICAL CANCER: ICD-10-CM

## 2024-07-16 DIAGNOSIS — Z11.3 SCREENING EXAMINATION FOR STI: ICD-10-CM

## 2024-07-16 DIAGNOSIS — E05.90 HYPERTHYROIDISM: ICD-10-CM

## 2024-07-16 DIAGNOSIS — Z00.00 ANNUAL PHYSICAL EXAM: Primary | ICD-10-CM

## 2024-07-16 DIAGNOSIS — F33.1 MODERATE EPISODE OF RECURRENT MAJOR DEPRESSIVE DISORDER: ICD-10-CM

## 2024-07-16 PROCEDURE — 99395 PREV VISIT EST AGE 18-39: CPT | Performed by: NURSE PRACTITIONER

## 2024-07-16 RX ORDER — VALACYCLOVIR HYDROCHLORIDE 500 MG/1
500 TABLET, FILM COATED ORAL DAILY
Qty: 90 TABLET | Refills: 3 | Status: SHIPPED | OUTPATIENT
Start: 2024-07-16

## 2024-07-16 NOTE — PROGRESS NOTES
Chief Complaint   Patient presents with    Annual Exam       Gillian Marlow is a 28 y.o. female and is here for a comprehensive physical exam.     Thyroid surgery consultation scheduled 24, 1415. Taking methimazole, metoprolol daily as prescribed.    Anxiety, depression.  Taking zoloft 25 mg daily, feels it is working well.  Scheduled to see counseling, will have to reschedule due to work schedule.  Denies depressed mood, anhedonia, insomnia, hypersomnia, fatigue, feelings of worthlessness, difficulty concentrating, impaired memory, SI, HI, panic attacks, weight change.       History:  LMP: 24  Menarche: 10 years old  Sexual activity:  heterosexual, new partner  Last pap date: never   : 0  Para: 0    Do you take any herbs or supplements that were not prescribed by a doctor? no    Health Habits:  Dental Exam.  Not up to date. Scared of the dentist.    Eye Exam.  Not up to date, does not wear corrective lenses   Exercise: 0 times/week.  Current exercise activities include: none  Healthy well balanced diet.    Health Maintenance   Topic Date Due    HEPATITIS C SCREENING  Never done    INFLUENZA VACCINE  2024    COVID-19 Vaccine ( - -24 season) 2024 (Originally 2023)    Pneumococcal Vaccine 0-64 (1 of 2 - PCV) 2025 (Originally 10/5/2001)    TDAP/TD VACCINES (3 - Td or Tdap) 2025 (Originally 2017)    ANNUAL PHYSICAL  2025    PAP SMEAR  2027    CHLAMYDIA SCREENING  Discontinued       PMH, PSH, SocHx, FamHx, Allergies, and Medications: Reviewed and updated in the Visit Navigator.     No Known Allergies  Past Medical History:   Diagnosis Date    Graves disease     Herpes     Thyroid disease      Past Surgical History:   Procedure Laterality Date    NO PAST SURGERIES       Social History     Socioeconomic History    Marital status: Single   Tobacco Use    Smoking status: Some Days     Types: Cigars    Smokeless tobacco: Never   Vaping Use    Vaping  "status: Never Used   Substance and Sexual Activity    Alcohol use: Yes     Comment: social    Drug use: Never    Sexual activity: Defer     Family History   Problem Relation Age of Onset    Anemia Mother     No Known Problems Father     Thyroid disease Other        Review of Systems  Review of Systems   All other systems reviewed and are negative.      Objective   /60   Pulse 71   Temp 99.1 °F (37.3 °C)   Ht 165.1 cm (65\")   Wt 54 kg (119 lb)   SpO2 97%   BMI 19.80 kg/m²   Body mass index is 19.8 kg/m².    Physical Exam  Exam conducted with a chaperone present (Crista Graham CMA).   Constitutional:       Appearance: She is well-developed. She is not ill-appearing.   HENT:      Head: Normocephalic.      Right Ear: Tympanic membrane, ear canal and external ear normal.      Left Ear: Tympanic membrane, ear canal and external ear normal.      Nose: Nose normal.      Mouth/Throat:      Mouth: Mucous membranes are moist.      Pharynx: Oropharynx is clear. Uvula midline.   Eyes:      Extraocular Movements: Extraocular movements intact.      Conjunctiva/sclera: Conjunctivae normal.      Pupils: Pupils are equal, round, and reactive to light.   Neck:      Thyroid: No thyromegaly.      Vascular: No carotid bruit.   Cardiovascular:      Rate and Rhythm: Normal rate and regular rhythm.      Pulses:           Radial pulses are 2+ on the right side and 2+ on the left side.        Dorsalis pedis pulses are 2+ on the right side and 2+ on the left side.      Heart sounds: Normal heart sounds.   Pulmonary:      Effort: Pulmonary effort is normal.      Breath sounds: Normal breath sounds.   Chest:   Breasts:     Breasts are symmetrical.      Right: Normal.      Left: Normal.   Abdominal:      General: Bowel sounds are normal.      Palpations: Abdomen is soft.      Tenderness: There is no abdominal tenderness.   Genitourinary:     Exam position: Supine.      Vagina: Normal.      Cervix: Normal.      Uterus: Normal.       " Adnexa: Right adnexa normal and left adnexa normal.   Musculoskeletal:         General: No tenderness or deformity. Normal range of motion.      Cervical back: Full passive range of motion without pain, normal range of motion and neck supple.      Right lower leg: No edema.      Left lower leg: No edema.   Lymphadenopathy:      Cervical: No cervical adenopathy.   Skin:     General: Skin is warm.      Capillary Refill: Capillary refill takes less than 2 seconds.   Neurological:      Mental Status: She is alert and oriented to person, place, and time.      Sensory: No sensory deficit.      Coordination: Coordination normal.      Gait: Gait normal.      Comments: CN II-XII normal   Psychiatric:         Attention and Perception: Attention normal.         Mood and Affect: Mood and affect normal.         Speech: Speech normal.         Behavior: Behavior normal.         Thought Content: Thought content normal.       Assessment & Plan   1. Healthy female exam.    2. Patient Counseling: Including but not Limited to the following, when appropriate:  --Nutrition: Stressed importance of moderation in sodium/caffeine intake, saturated fat and cholesterol, caloric balance, sufficient intake of fresh fruits, vegetables, fiber, calcium, iron, and 1 mg of folate supplement per day (for females capable of pregnancy).  --Exercise: Stressed the importance of regular exercise.   --Substance Abuse: Discussed cessation/primary prevention of tobacco, alcohol, or other drug use; driving or other dangerous activities under the influence; availability of treatment for abuse, as indicated based on social history.    --Sexuality: Discussed sexually transmitted diseases, partner selection, use of condoms, avoidance of unintended pregnancy  and contraceptive alternatives.   --Injury prevention: Discussed safety belts, safety helmets, smoke detector, smoking near bedding or upholstery.   --Dental health: Discussed importance of regular tooth  brushing, flossing, and dental visits.  --Immunizations reviewed.  3. Discussed the patient's BMI with her.  The BMI is above average; BMI management plan is completed  4. Return in about 1 year (around 7/16/2025) for Annual.  5. Age-appropriate Screening Scheduled    Assessment & Plan     Diagnoses and all orders for this visit:    1. Annual physical exam (Primary)    2. Screening for cervical cancer  -     LIQUID-BASED PAP SMEAR WITH HPV GENOTYPING IF ASCUS ([facility])    3. Screening examination for STI  -     LIQUID-BASED PAP SMEAR WITH HPV GENOTYPING IF ASCUS ([facility])    4. Hyperthyroidism    5. Moderate episode of recurrent major depressive disorder        - Encouraged to take part in daily physical exercise.          - Eat healthy, well balanced diet; avoid sugary foods or beverages        - Abstain from alcohol and drugs         - Ensure good night's sleep by creating calm space in bedroom, avoiding screen time 1-2 hours before bed, no caffeine after 5 pm        - Talk to supportive family and friends, as needed        - Consider journaling, other creative way to express feelings, if needed

## 2024-07-22 LAB — REF LAB TEST METHOD: NORMAL

## 2024-07-22 NOTE — PROGRESS NOTES
Pap smear is normal.  Sample insufficient, recommend rescreen in 1 year.  All other vaginal tests were normal/negative

## 2024-08-01 PROBLEM — E05.90 HYPERTHYROIDISM: Status: ACTIVE | Noted: 2024-08-01

## 2024-08-30 ENCOUNTER — PRE-ADMISSION TESTING (OUTPATIENT)
Dept: PREADMISSION TESTING | Facility: HOSPITAL | Age: 29
End: 2024-08-30
Payer: COMMERCIAL

## 2024-08-30 VITALS — BODY MASS INDEX: 19.69 KG/M2 | WEIGHT: 118.17 LBS | HEIGHT: 65 IN

## 2024-08-30 LAB
DEPRECATED RDW RBC AUTO: 37.3 FL (ref 37–54)
ERYTHROCYTE [DISTWIDTH] IN BLOOD BY AUTOMATED COUNT: 17.6 % (ref 12.3–15.4)
HBA1C MFR BLD: 5 % (ref 4.8–5.6)
HCT VFR BLD AUTO: 30.9 % (ref 34–46.6)
HGB BLD-MCNC: 8.8 G/DL (ref 12–15.9)
MCH RBC QN AUTO: 17.4 PG (ref 26.6–33)
MCHC RBC AUTO-ENTMCNC: 28.5 G/DL (ref 31.5–35.7)
MCV RBC AUTO: 61.2 FL (ref 79–97)
PLATELET # BLD AUTO: 373 10*3/MM3 (ref 140–450)
PMV BLD AUTO: 9.4 FL (ref 6–12)
POTASSIUM SERPL-SCNC: 4.1 MMOL/L (ref 3.5–5.2)
RBC # BLD AUTO: 5.05 10*6/MM3 (ref 3.77–5.28)
WBC NRBC COR # BLD AUTO: 5.18 10*3/MM3 (ref 3.4–10.8)

## 2024-08-30 PROCEDURE — 36415 COLL VENOUS BLD VENIPUNCTURE: CPT

## 2024-08-30 PROCEDURE — 85027 COMPLETE CBC AUTOMATED: CPT

## 2024-08-30 PROCEDURE — 84132 ASSAY OF SERUM POTASSIUM: CPT

## 2024-08-30 PROCEDURE — 83036 HEMOGLOBIN GLYCOSYLATED A1C: CPT

## 2024-08-30 RX ORDER — IBUPROFEN 200 MG
400 TABLET ORAL EVERY 6 HOURS PRN
COMMUNITY

## 2024-08-30 NOTE — PAT
An arrival time for procedure was not provided during PAT visit. If patient had any questions or concerns about their arrival time, they were instructed to contact their surgeon/physician.  Additionally, if the patient referred to an arrival time that was acquired from their my chart account, patient was encouraged to verify that time with their surgeon/physician. Arrival times are NOT provided in Pre Admission Testing Department.    Patient to apply Chlorhexadine wipes  to surgical area (as instructed) the night before procedure and the AM of procedure. Wipes provided.    Patient did not review general PAT education video as instructed in their preoperative information received from their surgeon.  One-on-one Pre Admission Testing general education provided during PAT visit.  Copies of PAT general education handouts (Incentive Spirometry, Meds to Beds Program, Patient Belongings, Pre-op skin preparation instructions, Blood Glucose testing, Visitor policy, Surgery FAQ, Code H) distributed to patient. Encouraged patient/family to read PAT general education handouts thoroughly and notify PAT staff with any questions or concerns. Patient instructed to bring PAT pass and completed skin prep sheet (if applicable) on the day of procedure. Patient verbalized understanding of all information and priority content.     Called and spoke to Jeanette in Dr Lyon's office concerning toothache that patient has been taking Ibuprofen 400 mg/6 hours for the pain for the last several days.  Dawson stated she would bring this to Dr Lyon's attention and contact patient on Tuesday regarding surgery schedule as patient did not think she would be able to see dentist prior to 9/6.

## 2025-01-13 RX ORDER — METOPROLOL TARTRATE 25 MG/1
25 TABLET, FILM COATED ORAL 2 TIMES DAILY
Qty: 180 TABLET | Refills: 1 | OUTPATIENT
Start: 2025-01-13

## 2025-01-13 NOTE — TELEPHONE ENCOUNTER
Rx Refill Note  Requested Prescriptions     Pending Prescriptions Disp Refills    metoprolol tartrate (LOPRESSOR) 25 MG tablet [Pharmacy Med Name: METOPROLOL TARTRATE 25MG TABLET] 180 tablet 1     Sig: TAKE ONE (1) TABLET BY MOUTH TWICE DAILY      Last office visit with prescribing clinician: 6/18/2024     Next office visit with prescribing clinician: Visit date not found     Sandra Deluca MA  01/13/25, 13:12 EST

## 2025-02-01 ENCOUNTER — HOSPITAL ENCOUNTER (EMERGENCY)
Facility: HOSPITAL | Age: 30
Discharge: HOME | End: 2025-02-01
Attending: STUDENT IN AN ORGANIZED HEALTH CARE EDUCATION/TRAINING PROGRAM
Payer: COMMERCIAL

## 2025-02-01 ENCOUNTER — APPOINTMENT (OUTPATIENT)
Dept: GENERAL RADIOLOGY | Facility: HOSPITAL | Age: 30
End: 2025-02-01
Payer: COMMERCIAL

## 2025-02-01 VITALS
TEMPERATURE: 97.6 F | WEIGHT: 128 LBS | DIASTOLIC BLOOD PRESSURE: 84 MMHG | RESPIRATION RATE: 16 BRPM | OXYGEN SATURATION: 100 % | HEART RATE: 86 BPM | BODY MASS INDEX: 21.33 KG/M2 | HEIGHT: 65 IN | SYSTOLIC BLOOD PRESSURE: 131 MMHG

## 2025-02-01 DIAGNOSIS — Z76.0 ENCOUNTER FOR MEDICATION REFILL: ICD-10-CM

## 2025-02-01 DIAGNOSIS — R07.9 CHEST PAIN, UNSPECIFIED TYPE: Primary | ICD-10-CM

## 2025-02-01 LAB
ALBUMIN SERPL-MCNC: 4.3 G/DL (ref 3.5–5.2)
ALBUMIN/GLOB SERPL: 1 G/DL
ALP SERPL-CCNC: 47 U/L (ref 39–117)
ALT SERPL W P-5'-P-CCNC: 7 U/L (ref 1–33)
ANION GAP SERPL CALCULATED.3IONS-SCNC: 11 MMOL/L (ref 5–15)
AST SERPL-CCNC: 17 U/L (ref 1–32)
BASOPHILS # BLD AUTO: 0.03 10*3/MM3 (ref 0–0.2)
BASOPHILS NFR BLD AUTO: 0.5 % (ref 0–1.5)
BILIRUB SERPL-MCNC: 0.3 MG/DL (ref 0–1.2)
BUN SERPL-MCNC: 6 MG/DL (ref 6–20)
BUN/CREAT SERPL: 7.9 (ref 7–25)
CALCIUM SPEC-SCNC: 8.9 MG/DL (ref 8.6–10.5)
CHLORIDE SERPL-SCNC: 100 MMOL/L (ref 98–107)
CO2 SERPL-SCNC: 25 MMOL/L (ref 22–29)
CREAT SERPL-MCNC: 0.76 MG/DL (ref 0.57–1)
CRP SERPL-MCNC: <0.3 MG/DL (ref 0–0.5)
D DIMER PPP FEU-MCNC: <0.27 MCGFEU/ML (ref 0–0.5)
DEPRECATED RDW RBC AUTO: 38 FL (ref 37–54)
EGFRCR SERPLBLD CKD-EPI 2021: 108.9 ML/MIN/1.73
EOSINOPHIL # BLD AUTO: 0.29 10*3/MM3 (ref 0–0.4)
EOSINOPHIL NFR BLD AUTO: 5.2 % (ref 0.3–6.2)
ERYTHROCYTE [DISTWIDTH] IN BLOOD BY AUTOMATED COUNT: 16.9 % (ref 12.3–15.4)
FLUAV SUBTYP SPEC NAA+PROBE: NOT DETECTED
FLUBV RNA ISLT QL NAA+PROBE: NOT DETECTED
GLOBULIN UR ELPH-MCNC: 4.1 GM/DL
GLUCOSE SERPL-MCNC: 106 MG/DL (ref 65–99)
HCT VFR BLD AUTO: 31.9 % (ref 34–46.6)
HGB BLD-MCNC: 9.4 G/DL (ref 12–15.9)
HYPOCHROMIA BLD QL: NORMAL
IMM GRANULOCYTES # BLD AUTO: 0.02 10*3/MM3 (ref 0–0.05)
IMM GRANULOCYTES NFR BLD AUTO: 0.4 % (ref 0–0.5)
LYMPHOCYTES # BLD AUTO: 2.43 10*3/MM3 (ref 0.7–3.1)
LYMPHOCYTES NFR BLD AUTO: 43.5 % (ref 19.6–45.3)
MAGNESIUM SERPL-MCNC: 1.9 MG/DL (ref 1.6–2.6)
MCH RBC QN AUTO: 18.8 PG (ref 26.6–33)
MCHC RBC AUTO-ENTMCNC: 29.5 G/DL (ref 31.5–35.7)
MCV RBC AUTO: 63.8 FL (ref 79–97)
MICROCYTES BLD QL: NORMAL
MONOCYTES # BLD AUTO: 0.73 10*3/MM3 (ref 0.1–0.9)
MONOCYTES NFR BLD AUTO: 13.1 % (ref 5–12)
NEUTROPHILS NFR BLD AUTO: 2.09 10*3/MM3 (ref 1.7–7)
NEUTROPHILS NFR BLD AUTO: 37.3 % (ref 42.7–76)
NRBC BLD AUTO-RTO: 0 /100 WBC (ref 0–0.2)
PLATELET # BLD AUTO: 402 10*3/MM3 (ref 140–450)
PMV BLD AUTO: 9 FL (ref 6–12)
POTASSIUM SERPL-SCNC: 3.9 MMOL/L (ref 3.5–5.2)
PROCALCITONIN SERPL-MCNC: <0.02 NG/ML (ref 0–0.25)
PROT SERPL-MCNC: 8.4 G/DL (ref 6–8.5)
RBC # BLD AUTO: 5 10*6/MM3 (ref 3.77–5.28)
SARS-COV-2 RNA RESP QL NAA+PROBE: NOT DETECTED
SMALL PLATELETS BLD QL SMEAR: NORMAL
SODIUM SERPL-SCNC: 136 MMOL/L (ref 136–145)
TARGETS BLD QL SMEAR: NORMAL
TROPONIN T SERPL HS-MCNC: <6 NG/L
TSH SERPL DL<=0.05 MIU/L-ACNC: 1.31 UIU/ML (ref 0.27–4.2)
WBC MORPH BLD: NORMAL
WBC NRBC COR # BLD AUTO: 5.59 10*3/MM3 (ref 3.4–10.8)

## 2025-02-01 PROCEDURE — 80053 COMPREHEN METABOLIC PANEL: CPT | Performed by: STUDENT IN AN ORGANIZED HEALTH CARE EDUCATION/TRAINING PROGRAM

## 2025-02-01 PROCEDURE — 86140 C-REACTIVE PROTEIN: CPT | Performed by: STUDENT IN AN ORGANIZED HEALTH CARE EDUCATION/TRAINING PROGRAM

## 2025-02-01 PROCEDURE — 87636 SARSCOV2 & INF A&B AMP PRB: CPT | Performed by: STUDENT IN AN ORGANIZED HEALTH CARE EDUCATION/TRAINING PROGRAM

## 2025-02-01 PROCEDURE — 84484 ASSAY OF TROPONIN QUANT: CPT | Performed by: STUDENT IN AN ORGANIZED HEALTH CARE EDUCATION/TRAINING PROGRAM

## 2025-02-01 PROCEDURE — 99284 EMERGENCY DEPT VISIT MOD MDM: CPT | Performed by: STUDENT IN AN ORGANIZED HEALTH CARE EDUCATION/TRAINING PROGRAM

## 2025-02-01 PROCEDURE — 84443 ASSAY THYROID STIM HORMONE: CPT | Performed by: STUDENT IN AN ORGANIZED HEALTH CARE EDUCATION/TRAINING PROGRAM

## 2025-02-01 PROCEDURE — 85007 BL SMEAR W/DIFF WBC COUNT: CPT | Performed by: STUDENT IN AN ORGANIZED HEALTH CARE EDUCATION/TRAINING PROGRAM

## 2025-02-01 PROCEDURE — 85379 FIBRIN DEGRADATION QUANT: CPT | Performed by: STUDENT IN AN ORGANIZED HEALTH CARE EDUCATION/TRAINING PROGRAM

## 2025-02-01 PROCEDURE — 84145 PROCALCITONIN (PCT): CPT | Performed by: STUDENT IN AN ORGANIZED HEALTH CARE EDUCATION/TRAINING PROGRAM

## 2025-02-01 PROCEDURE — 71045 X-RAY EXAM CHEST 1 VIEW: CPT

## 2025-02-01 PROCEDURE — 93005 ELECTROCARDIOGRAM TRACING: CPT | Performed by: STUDENT IN AN ORGANIZED HEALTH CARE EDUCATION/TRAINING PROGRAM

## 2025-02-01 PROCEDURE — 83735 ASSAY OF MAGNESIUM: CPT | Performed by: STUDENT IN AN ORGANIZED HEALTH CARE EDUCATION/TRAINING PROGRAM

## 2025-02-01 PROCEDURE — 85025 COMPLETE CBC W/AUTO DIFF WBC: CPT | Performed by: STUDENT IN AN ORGANIZED HEALTH CARE EDUCATION/TRAINING PROGRAM

## 2025-02-01 RX ORDER — METOPROLOL TARTRATE 25 MG/1
25 TABLET, FILM COATED ORAL ONCE
Status: COMPLETED | OUTPATIENT
Start: 2025-02-01 | End: 2025-02-01

## 2025-02-01 RX ORDER — METOPROLOL TARTRATE 25 MG/1
25 TABLET, FILM COATED ORAL 2 TIMES DAILY
Qty: 60 TABLET | Refills: 0 | Status: SHIPPED | OUTPATIENT
Start: 2025-02-01

## 2025-02-01 RX ADMIN — METOPROLOL TARTRATE 25 MG: 25 TABLET, FILM COATED ORAL at 16:01

## 2025-02-01 NOTE — DISCHARGE INSTRUCTIONS
You were evaluated for chest pain after running out of your metoprolol.  We got lab work and a chest x-ray that were all negative for any acute process.  You are now stable for discharge.  We have represcribed your metoprolol and have given your first dose in the emergency department.  This was sent to your pharmacy as requested.  As we discussed, we strongly suggest following up your endocrinologist to discuss these things moving forward.  We also got a thyroid test here which appears appropriate.  If you experience a very increase in chest pain or shortness of breath, please come back to the to the emergency department for further evaluation.  You are now stable for discharge.

## 2025-02-01 NOTE — ED PROVIDER NOTES
Subjective:  History of Present Illness:    Patient is a 29-year-old female with history of hypertension, Graves' disease who presents today with chest pain.  Reports that she has not been able to get a refill of her metoprolol and has gone off this for the last week.  States that she has began to have palpitations and chest pain.  Feels as though this is related to her Graves' disease and being off of her medication and now presents to our emergency department requesting a medication refill.  She denies any shortness of breath.  No abdominal pain nausea vomiting or diarrhea.  Denies any fevers.  No cough or congestion.  Denies any new leg swelling or leg pain.  No personal history of PE/DVT.      Nurses Notes reviewed and agree, including vitals, allergies, social history and prior medical history.     REVIEW OF SYSTEMS: All systems reviewed and not pertinent unless noted.  Review of Systems   Constitutional:  Positive for activity change. Negative for appetite change, chills, fatigue and fever.   HENT:  Negative for rhinorrhea, sinus pressure and sinus pain.    Eyes:  Negative for discharge and itching.   Respiratory:  Negative for cough and shortness of breath.    Cardiovascular:  Positive for chest pain and palpitations. Negative for leg swelling.   Gastrointestinal:  Negative for abdominal distention, abdominal pain, nausea and vomiting.   Endocrine: Negative for cold intolerance and heat intolerance.   Genitourinary:  Negative for decreased urine volume, difficulty urinating, flank pain, frequency, urgency, vaginal bleeding, vaginal discharge and vaginal pain.   Musculoskeletal:  Negative for gait problem, neck pain and neck stiffness.   Skin:  Negative for color change.   Allergic/Immunologic: Negative for environmental allergies.   Neurological:  Negative for seizures, syncope, facial asymmetry and speech difficulty.   Psychiatric/Behavioral:  Negative for self-injury and suicidal ideas.        Past Medical  "History:   Diagnosis Date    Graves disease     Herpes     genital    Hypertension     Thyroid disease        Allergies:    Patient has no known allergies.      Past Surgical History:   Procedure Laterality Date    NO PAST SURGERIES           Social History     Socioeconomic History    Marital status: Single   Tobacco Use    Smoking status: Every Day     Types: Cigars     Start date: 2022    Smokeless tobacco: Never   Vaping Use    Vaping status: Never Used   Substance and Sexual Activity    Alcohol use: Yes     Comment: social    Drug use: Never    Sexual activity: Defer         Family History   Problem Relation Age of Onset    Anemia Mother     No Known Problems Father     Thyroid disease Other        Objective  Physical Exam:  /84   Pulse 86   Temp 97.6 °F (36.4 °C) (Oral)   Resp 16   Ht 165.1 cm (65\")   Wt 58.1 kg (128 lb)   LMP 01/13/2025   SpO2 100%   BMI 21.30 kg/m²      Physical Exam  Constitutional:       General: She is not in acute distress.     Appearance: Normal appearance. She is not ill-appearing.   HENT:      Head: Normocephalic and atraumatic.      Nose: Nose normal. No congestion or rhinorrhea.      Mouth/Throat:      Mouth: Mucous membranes are dry.      Pharynx: Oropharynx is clear. No oropharyngeal exudate or posterior oropharyngeal erythema.   Eyes:      Extraocular Movements: Extraocular movements intact.      Conjunctiva/sclera: Conjunctivae normal.      Pupils: Pupils are equal, round, and reactive to light.   Cardiovascular:      Rate and Rhythm: Regular rhythm. Tachycardia present.      Pulses: Normal pulses.      Heart sounds: Normal heart sounds.   Pulmonary:      Effort: Pulmonary effort is normal. No respiratory distress.      Breath sounds: Normal breath sounds.   Abdominal:      General: Abdomen is flat. Bowel sounds are normal. There is no distension.      Palpations: Abdomen is soft.      Tenderness: There is no abdominal tenderness.   Musculoskeletal:         " General: No swelling or tenderness. Normal range of motion.      Cervical back: Normal range of motion and neck supple.      Right lower leg: No edema.      Left lower leg: No edema.   Skin:     General: Skin is warm and dry.      Capillary Refill: Capillary refill takes less than 2 seconds.   Neurological:      General: No focal deficit present.      Mental Status: She is alert and oriented to person, place, and time. Mental status is at baseline.      Cranial Nerves: No cranial nerve deficit.      Sensory: No sensory deficit.      Motor: No weakness.      Coordination: Coordination normal.   Psychiatric:         Mood and Affect: Mood normal.         Behavior: Behavior normal.         Thought Content: Thought content normal.         Judgment: Judgment normal.         Procedures    ED Course:    ED Course as of 02/01/25 1848   Sat Feb 01, 2025   1452 EKG interpreted by me, sinus tachycardia with no concerning ST changes noted, rate of 102 [JE]   1511 Stable anemia per my independent interpretation of patient's last lab values [JE]   1513 Chest x-ray independently interpreted by me showing no acute process [JE]      ED Course User Index  [JE] Antonio Rose MD       Lab Results (last 24 hours)       Procedure Component Value Units Date/Time    COVID-19 and FLU A/B PCR, 1 HR TAT - Swab, Nasopharynx [[id number]]  (Normal) Collected: 02/01/25 1458    Specimen: Swab from Nasopharynx Updated: 02/01/25 1521     COVID19 Not Detected     Influenza A PCR Not Detected     Influenza B PCR Not Detected    Narrative:      Fact sheet for providers: https://www.fda.gov/media/315200/download    Fact sheet for patients: https://www.fda.gov/media/253083/download    Test performed by PCR.    CBC & Differential [[id number]]  (Abnormal) Collected: 02/01/25 1458    Specimen: Blood Updated: 02/01/25 1520    Narrative:      The following orders were created for panel order CBC & Differential.  Procedure                                Abnormality         Status                     ---------                               -----------         ------                     CBC Auto Differential[[id number]]        Abnormal            Final result               Scan Slide[[id number]]                                       Final result                 Please view results for these tests on the individual orders.    Comprehensive Metabolic Panel [[id number]]  (Abnormal) Collected: 02/01/25 1458    Specimen: Blood Updated: 02/01/25 1528     Glucose 106 mg/dL      BUN 6 mg/dL      Creatinine 0.76 mg/dL      Sodium 136 mmol/L      Potassium 3.9 mmol/L      Chloride 100 mmol/L      CO2 25.0 mmol/L      Calcium 8.9 mg/dL      Total Protein 8.4 g/dL      Albumin 4.3 g/dL      ALT (SGPT) 7 U/L      AST (SGOT) 17 U/L      Alkaline Phosphatase 47 U/L      Total Bilirubin 0.3 mg/dL      Globulin 4.1 gm/dL      A/G Ratio 1.0 g/dL      BUN/Creatinine Ratio 7.9     Anion Gap 11.0 mmol/L      eGFR 108.9 mL/min/1.73     Narrative:      GFR Categories in Chronic Kidney Disease (CKD)      GFR Category          GFR (mL/min/1.73)    Interpretation  G1                     90 or greater         Normal or high (1)  G2                      60-89                Mild decrease (1)  G3a                   45-59                Mild to moderate decrease  G3b                   30-44                Moderate to severe decrease  G4                    15-29                Severe decrease  G5                    14 or less           Kidney failure          (1)In the absence of evidence of kidney disease, neither GFR category G1 or G2 fulfill the criteria for CKD.    eGFR calculation 2021 CKD-EPI creatinine equation, which does not include race as a factor    High Sensitivity Troponin T [[id number]]  (Normal) Collected: 02/01/25 1458    Specimen: Blood Updated: 02/01/25 1541     HS Troponin T <6 ng/L     Narrative:      High Sensitive Troponin T Reference Range:  <14.0 ng/L- Negative Female for  "AMI  <22.0 ng/L- Negative Male for AMI  >=14 - Abnormal Female indicating possible myocardial injury.  >=22 - Abnormal Male indicating possible myocardial injury.   Clinicians would have to utilize clinical acumen, EKG, Troponin, and serial changes to determine if it is an Acute Myocardial Infarction or myocardial injury due to an underlying chronic condition.         C-reactive Protein [[id number]]  (Normal) Collected: 02/01/25 1458    Specimen: Blood Updated: 02/01/25 1534     C-Reactive Protein <0.30 mg/dL     Procalcitonin [[id number]]  (Normal) Collected: 02/01/25 1458    Specimen: Blood Updated: 02/01/25 1536     Procalcitonin <0.02 ng/mL     Narrative:      As a Marker for Sepsis (Non-Neonates):    1. <0.5 ng/mL represents a low risk of severe sepsis and/or septic shock.  2. >2 ng/mL represents a high risk of severe sepsis and/or septic shock.    As a Marker for Lower Respiratory Tract Infections that require antibiotic therapy:    PCT on Admission    Antibiotic Therapy       6-12 Hrs later    >0.5                Strongly Recommended  >0.25 - <0.5        Recommended   0.1 - 0.25          Discouraged              Remeasure/reassess PCT  <0.1                Strongly Discouraged     Remeasure/reassess PCT    As 28 day mortality risk marker: \"Change in Procalcitonin Result\" (>80% or <=80%) if Day 0 (or Day 1) and Day 4 values are available. Refer to http://www.[facility]-pct-calculator.com    Change in PCT <=80%  A decrease of PCT levels below or equal to 80% defines a positive change in PCT test result representing a higher risk for 28-day all-cause mortality of patients diagnosed with severe sepsis for septic shock.    Change in PCT >80%  A decrease of PCT levels of more than 80% defines a negative change in PCT result representing a lower risk for 28-day all-cause mortality of patients diagnosed with severe sepsis or septic shock.       Magnesium [[id number]]  (Normal) Collected: 02/01/25 1458    Specimen: Blood " "Updated: 02/01/25 1528     Magnesium 1.9 mg/dL     CBC Auto Differential [[id number]]  (Abnormal) Collected: 02/01/25 1458    Specimen: Blood Updated: 02/01/25 1505     WBC 5.59 10*3/mm3      RBC 5.00 10*6/mm3      Hemoglobin 9.4 g/dL      Hematocrit 31.9 %      MCV 63.8 fL      MCH 18.8 pg      MCHC 29.5 g/dL      RDW 16.9 %      RDW-SD 38.0 fl      MPV 9.0 fL      Platelets 402 10*3/mm3      Neutrophil % 37.3 %      Lymphocyte % 43.5 %      Monocyte % 13.1 %      Eosinophil % 5.2 %      Basophil % 0.5 %      Immature Grans % 0.4 %      Neutrophils, Absolute 2.09 10*3/mm3      Lymphocytes, Absolute 2.43 10*3/mm3      Monocytes, Absolute 0.73 10*3/mm3      Eosinophils, Absolute 0.29 10*3/mm3      Basophils, Absolute 0.03 10*3/mm3      Immature Grans, Absolute 0.02 10*3/mm3      nRBC 0.0 /100 WBC     TSH Rfx On Abnormal To Free T4 [[id number]]  (Normal) Collected: 02/01/25 1458    Specimen: Blood Updated: 02/01/25 1541     TSH 1.310 uIU/mL     D-dimer, Quantitative [[id number]]  (Normal) Collected: 02/01/25 1458    Specimen: Blood Updated: 02/01/25 1540     D-Dimer, Quantitative <0.27 MCGFEU/mL     Narrative:      According to the assay 's published package insert, a normal (<0.50 MCGFEU/mL) D-dimer result in conjunction with a non-high clinical probability assessment, excludes deep vein thrombosis (DVT) and pulmonary embolism (PE) with high sensitivity.    D-dimer values increase with age and this can make VTE exclusion of an older population difficult. To address this, the [employer], based on best available evidence and recent guidelines, recommends that clinicians use age-adjusted D-dimer thresholds in patients greater than 50 years of age with: a) a low probability of PE who do not meet all Pulmonary Embolism Rule Out Criteria, or b) in those with intermediate probability of PE.   The formula for an age-adjusted D-dimer cut-off is \"age/100\".  For example, a 60 year old patient would " have an age-adjusted cut-off of 0.60 MCGFEU/mL and an 80 year old 0.80 MCGFEU/mL.    Scan Slide [[id number]] Collected: 02/01/25 1458    Specimen: Blood Updated: 02/01/25 1520     Hypochromia Mod/2+     Microcytes Slight/1+     Target Cells Slight/1+     WBC Morphology Normal     Platelet Estimate Increased             XR Chest 1 View    Result Date: 2/1/2025  PROCEDURE: XR CHEST 1 VW-  HISTORY: Chest pain  COMPARISON: January 27, 2020.  FINDINGS: The heart is normal in size. The mediastinum is unremarkable. There are increased interstitial markings which may represent interstitial infiltrates or edema.. There is no pneumothorax.  There are no acute osseous abnormalities.      Impression: There are increased interstitial markings which may represent interstitial infiltrates or edema..  Continued followup is recommended.  This report was signed and finalized on 2/1/2025 3:32 PM by Víctor Walsh DO.          MDM     Amount and/or Complexity of Data Reviewed  Clinical lab tests: reviewed  Tests in the radiology section of CPT®: reviewed  Tests in the medicine section of CPT®: reviewed  Decide to obtain previous medical records or to obtain history from someone other than the patient: yes  Independent visualization of images, tracings, or specimens: yes        Initial impression of presenting illness: Chest pain    DDX: includes but is not limited to: ACS, MI, Bactrim pneumonia, viral URI, PE, beta-blocker withdrawal    Patient arrives stable with vitals interpreted by myself.     Pertinent features from physical exam: Tachycardic regular rhythm, no murmur, clear to auscultation, nontender to abdominal palpation, no pedal edema.    Initial diagnostic plan: CBC, CMP, troponin, D-dimer, EKG, chest x-ray, CRP, Pro-Martin, magnesium, TSH with reflex to T4    Results from initial plan were reviewed and interpreted by me revealing no concern for acute cardiac process, no concern for PE, TSH is unremarkable on workup    Diagnostic  information from other sources: Reviewed past medical records    Interventions / Re-evaluation: Given patient's home dose of metoprolol given concerns for beta-blocker withdrawal    Results/clinical rationale were discussed with patient at bedside    Consultations/Discussion of results with other physicians: Discussed negative workup in emergency department, no concern for ACS or PE.  Given patient's symptoms, strongly recommended that she continue to follow with her endocrinologist for further treatment of her Graves' disease.  Given my concerns for beta-blocker withdrawal have represcribed patient's metoprolol and encouraged her to follow-up with her primary care doctor as well as her endocrinologist for further management evaluation of chronic condition.  Strict turn precaution for severe increase in chest pain or shortness of breath.    Disposition plan: Discharge  -----        Final diagnoses:   Chest pain, unspecified type          Antonio Rose MD  02/01/25 7100

## 2025-02-01 NOTE — Clinical Note
[facility] EMERGENCY DEPARTMENT  [address] [address]  Phone: [phone]    Gillian Marlow was seen and treated in our emergency department on 2/1/2025.  She may return to work on 02/04/2025.         Thank you for choosing [facility].    Antonio Rose MD

## 2025-04-08 ENCOUNTER — OFFICE VISIT (OUTPATIENT)
Dept: INTERNAL MEDICINE | Facility: CLINIC | Age: 30
End: 2025-04-08
Payer: COMMERCIAL

## 2025-04-08 VITALS
BODY MASS INDEX: 21.83 KG/M2 | DIASTOLIC BLOOD PRESSURE: 82 MMHG | TEMPERATURE: 98.4 F | WEIGHT: 131 LBS | HEART RATE: 55 BPM | HEIGHT: 65 IN | OXYGEN SATURATION: 99 % | SYSTOLIC BLOOD PRESSURE: 124 MMHG

## 2025-04-08 DIAGNOSIS — Z76.0 ENCOUNTER FOR MEDICATION REFILL: ICD-10-CM

## 2025-04-08 DIAGNOSIS — E05.90 HYPERTHYROIDISM: Primary | ICD-10-CM

## 2025-04-08 DIAGNOSIS — B00.9 HSV-2 (HERPES SIMPLEX VIRUS 2) INFECTION: ICD-10-CM

## 2025-04-08 DIAGNOSIS — Z59.9 FINANCIAL DIFFICULTIES: ICD-10-CM

## 2025-04-08 PROCEDURE — 99214 OFFICE O/P EST MOD 30 MIN: CPT | Performed by: NURSE PRACTITIONER

## 2025-04-08 RX ORDER — METOPROLOL TARTRATE 25 MG/1
25 TABLET, FILM COATED ORAL 2 TIMES DAILY
Qty: 180 TABLET | Refills: 0 | Status: SHIPPED | OUTPATIENT
Start: 2025-04-08

## 2025-04-08 RX ORDER — METHIMAZOLE 5 MG/1
7.5 TABLET ORAL DAILY
Qty: 135 TABLET | Refills: 0 | Status: SHIPPED | OUTPATIENT
Start: 2025-04-08

## 2025-04-08 RX ORDER — VALACYCLOVIR HYDROCHLORIDE 500 MG/1
500 TABLET, FILM COATED ORAL DAILY
Qty: 90 TABLET | Refills: 3 | Status: SHIPPED | OUTPATIENT
Start: 2025-04-08

## 2025-04-08 SDOH — ECONOMIC STABILITY - INCOME SECURITY: PROBLEM RELATED TO HOUSING AND ECONOMIC CIRCUMSTANCES, UNSPECIFIED: Z59.9

## 2025-04-08 NOTE — PROGRESS NOTES
Office Visit      Patient Name: Gillian Marlow  : 1995   MRN: [id number]     Chief Complaint:    Chief Complaint   Patient presents with    Med Refill       History of Present Illness  Gillian Marlow is a 29 y.o. female who presents for medication refills.    Hyperthyroidism. She has been unable to secure an appointment with her endocrinologist until , which has resulted in her being without necessary medications. She experienced a tachycardic episode that necessitated an emergency room visit where she was advised to seek medication refills from her primary care physician if she was unable to see her endocrinologist. She requires refills of metoprolol and methimazole. Her last thyroid function test was conducted in September. She had previously planned for a thyroidectomy in , but was informed of a potential oral infection that needed to be addressed prior to surgery. She has scheduled dental appointments for tooth extraction, which will cost $ 740 out-of-pocket. She is currently saving money for this procedure and plans to reschedule the thyroidectomy once the dental issue is resolved. She is seeking assistance from the  for financial aid. She has been off her medication for a few days, having received a month's supply from the emergency room in January and an additional two months' supply from the [facility].    She also requests a refill of valacyclovir due to recurrent flares. She had been taking it daily but ran out. Upon discontinuation, she did not experience any issues until her menstrual cycle triggered an outbreak. Uses condoms with sexual activity.      Anxiety, depression.  No longer taking zoloft. Does not feel it is necessary at this time.  Denies depressed mood, anhedonia, difficulty concentrating, impaired memory, SI, HI, panic attacks, weight change.        Subjective      I have reviewed and the following portions of the patient's  "history were updated as appropriate: past family history, past medical history, past social history, past surgical history and problem list.      Current Outpatient Medications:     ibuprofen (ADVIL,MOTRIN) 200 MG tablet, Take 2 tablets by mouth Every 6 (Six) Hours As Needed for Mild Pain (tooth pain - been taking these last few days)., Disp: , Rfl:     methIMAzole (TAPAZOLE) 5 MG tablet, Take 1.5 tablets by mouth Daily., Disp: 135 tablet, Rfl: 0    metoprolol tartrate (LOPRESSOR) 25 MG tablet, Take 1 tablet by mouth 2 (Two) Times a Day., Disp: 180 tablet, Rfl: 0    valACYclovir (Valtrex) 500 MG tablet, Take 1 tablet by mouth Daily., Disp: 90 tablet, Rfl: 3    No Known Allergies    Objective     Physical Exam:  Vital Signs:   Vitals:    04/08/25 1029   BP: 124/82   Pulse: 55   Temp: 98.4 °F (36.9 °C)   SpO2: 99%   Weight: 59.4 kg (131 lb)   Height: 165.1 cm (65\")     Body mass index is 21.8 kg/m².  BMI is within normal parameters. No other follow-up for BMI required.       Physical Exam  Constitutional:       Appearance: She is not ill-appearing.   HENT:      Head: Normocephalic.      Right Ear: External ear normal.      Left Ear: External ear normal.   Eyes:      Conjunctiva/sclera: Conjunctivae normal.      Pupils: Pupils are equal, round, and reactive to light.   Cardiovascular:      Rate and Rhythm: Normal rate and regular rhythm.      Heart sounds: Normal heart sounds.   Pulmonary:      Effort: Pulmonary effort is normal.      Breath sounds: Normal breath sounds.   Musculoskeletal:      Cervical back: Normal range of motion and neck supple.   Skin:     General: Skin is warm.      Capillary Refill: Capillary refill takes less than 2 seconds.   Neurological:      Mental Status: She is alert and oriented to person, place, and time.      Coordination: Coordination normal.      Gait: Gait normal.   Psychiatric:         Mood and Affect: Mood normal.         Behavior: Behavior normal.         Thought Content: Thought " content normal.             Assessment / Plan      Assessment/Plan:   Diagnoses and all orders for this visit:    1. Hyperthyroidism (Primary)  -     methIMAzole (TAPAZOLE) 5 MG tablet; Take 1.5 tablets by mouth Daily.  Dispense: 135 tablet; Refill: 0  -     metoprolol tartrate (LOPRESSOR) 25 MG tablet; Take 1 tablet by mouth 2 (Two) Times a Day.  Dispense: 180 tablet; Refill: 0        - Keep scheduled appointment with Endocrinology     2. Encounter for medication refill    3. Financial difficulties  -     Ambulatory Referral to Social Care Services (Amb Case Mgmt)    4. HSV-2 (herpes simplex virus 2) infection  -     valACYclovir (Valtrex) 500 MG tablet; Take 1 tablet by mouth Daily.  Dispense: 90 tablet; Refill: 3  - Continue to use condoms with sexual activity.               Follow Up:   Return for Annual.    Patient was given instructions and counseling regarding her condition or for health maintenance advice. Please see specific information pulled into the AVS if appropriate.       Primary Care Bryson Anders Torres     Please note that portions of this note may have been completed with a voice recognition program. Efforts were made to edit dictation, but occasionally words are mistranscribed.

## 2025-04-09 ENCOUNTER — REFERRAL TRIAGE (OUTPATIENT)
Age: 30
End: 2025-04-09
Payer: COMMERCIAL

## 2025-04-11 ENCOUNTER — PATIENT OUTREACH (OUTPATIENT)
Age: 30
End: 2025-04-11
Payer: COMMERCIAL

## 2025-04-11 NOTE — OUTREACH NOTE
SW attempted contact with UTRx1. SW will attempt again in a couple of business days.     Shala MCCARTHY -   Ambulatory Case Management    4/11/2025, 09:39 EDT

## 2025-04-14 ENCOUNTER — PATIENT OUTREACH (OUTPATIENT)
Age: 30
End: 2025-04-14
Payer: COMMERCIAL

## 2025-04-14 NOTE — OUTREACH NOTE
SW attempted to contact pt a second time, and scheduled a third call for one week out. SW will attempt again in a week.     Shala MCCARTHY -   Ambulatory Case Management    4/14/2025, 10:40 EDT

## 2025-04-21 ENCOUNTER — PATIENT OUTREACH (OUTPATIENT)
Age: 30
End: 2025-04-21
Payer: COMMERCIAL

## 2025-04-21 NOTE — OUTREACH NOTE
UTRx3. SW has attempted to contact pt with UTRx3. SW will D/c pt due to UTR. Pt may contact SW and received services, should they be needed in the future.     Shala MCCARTHY -   Ambulatory Case Management    4/21/2025, 12:46 EDT

## 2025-06-03 ENCOUNTER — OFFICE VISIT (OUTPATIENT)
Dept: ENDOCRINOLOGY | Facility: CLINIC | Age: 30
End: 2025-06-03
Payer: COMMERCIAL

## 2025-06-03 VITALS
DIASTOLIC BLOOD PRESSURE: 78 MMHG | SYSTOLIC BLOOD PRESSURE: 120 MMHG | HEIGHT: 65 IN | WEIGHT: 133 LBS | BODY MASS INDEX: 22.16 KG/M2 | HEART RATE: 83 BPM

## 2025-06-03 DIAGNOSIS — E05.00 GRAVES' DISEASE: ICD-10-CM

## 2025-06-03 DIAGNOSIS — E01.0 THYROMEGALY: ICD-10-CM

## 2025-06-03 DIAGNOSIS — E05.90 HYPERTHYROIDISM: Primary | ICD-10-CM

## 2025-06-03 PROCEDURE — 84481 FREE ASSAY (FT-3): CPT | Performed by: INTERNAL MEDICINE

## 2025-06-03 PROCEDURE — 85025 COMPLETE CBC W/AUTO DIFF WBC: CPT | Performed by: INTERNAL MEDICINE

## 2025-06-03 PROCEDURE — 84443 ASSAY THYROID STIM HORMONE: CPT | Performed by: INTERNAL MEDICINE

## 2025-06-03 PROCEDURE — 99213 OFFICE O/P EST LOW 20 MIN: CPT | Performed by: INTERNAL MEDICINE

## 2025-06-03 PROCEDURE — 36415 COLL VENOUS BLD VENIPUNCTURE: CPT | Performed by: INTERNAL MEDICINE

## 2025-06-03 PROCEDURE — 80053 COMPREHEN METABOLIC PANEL: CPT | Performed by: INTERNAL MEDICINE

## 2025-06-03 PROCEDURE — 84439 ASSAY OF FREE THYROXINE: CPT | Performed by: INTERNAL MEDICINE

## 2025-06-03 NOTE — PROGRESS NOTES
"Chief Complaint   Patient presents with    Thyroid Problem        HPI   Gillian Marlow is a 29 y.o. female had concerns including Thyroid Problem.       Patient was last seen in June 2024.  She was referred to surgery at that time. She did see surgery but ultimately procedure was delayed due to dental issues. Patient is schedule to see dentistry next week. She remains on methimazole 5 mg daily. She denies neck changes, changes in weight, changes in bowel habits.      The following portions of the patient's history were reviewed and updated as appropriate: allergies, current medications, and past social history.    Review of Systems   Constitutional:  Negative for unexpected weight gain and unexpected weight loss.   Cardiovascular:  Negative for palpitations.   Gastrointestinal:  Negative for constipation and diarrhea.          /78   Pulse 83   Ht 165.1 cm (65\")   Wt 60.3 kg (133 lb)   BMI 22.13 kg/m²      Physical Exam      Constitutional:  well developed; well nourished  no acute distress  appears stated age   ENT/Thyroid: enlarged   Eyes: Conjunctiva: clear   Respiratory:  breathing is unlabored  clear to auscultation bilaterally   Cardiovascular:  regular rate and rhythm   Chest:  Not performed.   Abdomen: Not performed.   : Not performed.   Musculoskeletal: Not performed   Skin: not performed.   Neuro: mental status, speech normal   Psych: mood and affect are within normal limits       Labs/Imaging   Latest Reference Range & Units 02/27/24 09:38 06/18/24 11:43 02/01/25 14:58   TSH Baseline 0.270 - 4.200 uIU/mL <0.005 (L) <0.005 (L) 1.310   Free T4 0.92 - 1.68 ng/dL 2.03 (H) 1.53    T3, Free 2.00 - 4.40 pg/mL 6.16 (H) 5.59 (H)    (L): Data is abnormally low  (H): Data is abnormally high    Diagnoses and all orders for this visit:    1. Hyperthyroidism (Primary)  -     TSH; Future  -     T4, Free; Future  -     T3, Free; Future  -     Comprehensive Metabolic Panel; Future  -     CBC & " Differential; Future  -     TSH  -     T4, Free  -     T3, Free  -     Comprehensive Metabolic Panel  -     CBC & Differential    2. Thyromegaly    3. Graves' disease    Patient has hyperthyroidism secondary to Graves' disease. She previously had recurrence of hyperthyroidism following discontinuation of methimazole. She was referred to surgery but procedure was delayed pending resolution of dental issues.  Plan to update labs today and adjust medication as clinically indicated. Options for definitive management were again reviewed, she intends to proceed with surgery, pending resolution of dental issues.    Return in about 4 months (around 10/3/2025). The patient was instructed to contact the clinic with any interval questions or concerns.    Electronically signed by: Dayan Shea MD   Endocrinologist    Dictated Utilizing Dragon Dictation

## 2025-06-04 LAB
ALBUMIN SERPL-MCNC: 4.4 G/DL (ref 3.5–5.2)
ALBUMIN/GLOB SERPL: 1.2 G/DL
ALP SERPL-CCNC: 41 U/L (ref 39–117)
ALT SERPL W P-5'-P-CCNC: 8 U/L (ref 1–33)
ANION GAP SERPL CALCULATED.3IONS-SCNC: 11 MMOL/L (ref 5–15)
AST SERPL-CCNC: 20 U/L (ref 1–32)
BASOPHILS # BLD AUTO: 0.05 10*3/MM3 (ref 0–0.2)
BASOPHILS NFR BLD AUTO: 0.8 % (ref 0–1.5)
BILIRUB SERPL-MCNC: 0.2 MG/DL (ref 0–1.2)
BUN SERPL-MCNC: 8 MG/DL (ref 6–20)
BUN/CREAT SERPL: 10.4 (ref 7–25)
CALCIUM SPEC-SCNC: 9.4 MG/DL (ref 8.6–10.5)
CHLORIDE SERPL-SCNC: 100 MMOL/L (ref 98–107)
CO2 SERPL-SCNC: 25 MMOL/L (ref 22–29)
CREAT SERPL-MCNC: 0.77 MG/DL (ref 0.57–1)
DEPRECATED RDW RBC AUTO: 39.1 FL (ref 37–54)
EGFRCR SERPLBLD CKD-EPI 2021: 107.2 ML/MIN/1.73
EOSINOPHIL # BLD AUTO: 0.34 10*3/MM3 (ref 0–0.4)
EOSINOPHIL NFR BLD AUTO: 5.2 % (ref 0.3–6.2)
ERYTHROCYTE [DISTWIDTH] IN BLOOD BY AUTOMATED COUNT: 16 % (ref 12.3–15.4)
GLOBULIN UR ELPH-MCNC: 3.7 GM/DL
GLUCOSE SERPL-MCNC: 78 MG/DL (ref 65–99)
HCT VFR BLD AUTO: 32.4 % (ref 34–46.6)
HGB BLD-MCNC: 8.5 G/DL (ref 12–15.9)
IMM GRANULOCYTES # BLD AUTO: 0.02 10*3/MM3 (ref 0–0.05)
IMM GRANULOCYTES NFR BLD AUTO: 0.3 % (ref 0–0.5)
LYMPHOCYTES # BLD AUTO: 1.99 10*3/MM3 (ref 0.7–3.1)
LYMPHOCYTES NFR BLD AUTO: 30.2 % (ref 19.6–45.3)
MCH RBC QN AUTO: 18.2 PG (ref 26.6–33)
MCHC RBC AUTO-ENTMCNC: 26.2 G/DL (ref 31.5–35.7)
MCV RBC AUTO: 69.4 FL (ref 79–97)
MONOCYTES # BLD AUTO: 0.65 10*3/MM3 (ref 0.1–0.9)
MONOCYTES NFR BLD AUTO: 9.8 % (ref 5–12)
NEUTROPHILS NFR BLD AUTO: 3.55 10*3/MM3 (ref 1.7–7)
NEUTROPHILS NFR BLD AUTO: 53.7 % (ref 42.7–76)
PLATELET # BLD AUTO: 416 10*3/MM3 (ref 140–450)
PMV BLD AUTO: 9.6 FL (ref 6–12)
POTASSIUM SERPL-SCNC: 4.3 MMOL/L (ref 3.5–5.2)
PROT SERPL-MCNC: 8.1 G/DL (ref 6–8.5)
RBC # BLD AUTO: 4.67 10*6/MM3 (ref 3.77–5.28)
SODIUM SERPL-SCNC: 136 MMOL/L (ref 136–145)
T3FREE SERPL-MCNC: 3.14 PG/ML (ref 2–4.4)
T4 FREE SERPL-MCNC: 0.83 NG/DL (ref 0.92–1.68)
TSH SERPL DL<=0.05 MIU/L-ACNC: 0.95 UIU/ML (ref 0.27–4.2)
WBC NRBC COR # BLD AUTO: 6.6 10*3/MM3 (ref 3.4–10.8)

## 2025-06-05 ENCOUNTER — TELEPHONE (OUTPATIENT)
Dept: ENDOCRINOLOGY | Facility: CLINIC | Age: 30
End: 2025-06-05

## 2025-06-28 ENCOUNTER — OFFICE VISIT (OUTPATIENT)
Dept: INTERNAL MEDICINE | Facility: CLINIC | Age: 30
End: 2025-06-28
Payer: COMMERCIAL

## 2025-06-28 VITALS
OXYGEN SATURATION: 99 % | SYSTOLIC BLOOD PRESSURE: 120 MMHG | HEIGHT: 65 IN | HEART RATE: 77 BPM | WEIGHT: 131.8 LBS | DIASTOLIC BLOOD PRESSURE: 78 MMHG | RESPIRATION RATE: 16 BRPM | BODY MASS INDEX: 21.96 KG/M2 | TEMPERATURE: 97.4 F

## 2025-06-28 DIAGNOSIS — D50.8 OTHER IRON DEFICIENCY ANEMIA: Primary | ICD-10-CM

## 2025-06-28 DIAGNOSIS — D64.9 ANEMIA, UNSPECIFIED TYPE: ICD-10-CM

## 2025-06-28 DIAGNOSIS — E05.90 HYPERTHYROIDISM: ICD-10-CM

## 2025-06-28 PROCEDURE — 99214 OFFICE O/P EST MOD 30 MIN: CPT | Performed by: NURSE PRACTITIONER

## 2025-06-28 NOTE — PROGRESS NOTES
Office Visit      Patient Name: Gillian Marlow  : 1995   MRN: [id number]     Chief Complaint:    Chief Complaint   Patient presents with    Anemia     Recent labs with Endocrinology       History of Present Illness:     History of Present Illness  Gillian Marlow is a 29 y.o. female who is here today for evaluation of anemia and recent labs with endocrinology.    She was recently informed of her anemic condition following lab results. Her mother also has a history of anemia, which led to a hysterectomy. She reports heavy menstrual bleeding but does not experience excessive fatigue. There is no family history of thalassemia or sickle cell anemia. She has not been provided with her blood work results. She does not report any presence of blood in her stool or bleeding during tooth brushing. She does not experience any unexpected swelling, abdominal pain, or pain on either side of her body. She has a history of easy bruising and reports significant alcohol consumption, which she has been trying to reduce over the past year. She is currently on methimazole for her thyroid condition. She was scheduled for a thyroidectomy but was advised to undergo dental work first.            Subjective      I have reviewed and the following portions of the patient's history were updated as appropriate: past family history, past medical history, past social history, past surgical history and problem list.      Current Outpatient Medications:     ibuprofen (ADVIL,MOTRIN) 200 MG tablet, Take 2 tablets by mouth Every 6 (Six) Hours As Needed for Mild Pain (tooth pain - been taking these last few days)., Disp: , Rfl:     methIMAzole (TAPAZOLE) 5 MG tablet, Take 1 tablet by mouth Daily., Disp: 90 tablet, Rfl: 1    metoprolol tartrate (LOPRESSOR) 25 MG tablet, Take 1 tablet by mouth 2 (Two) Times a Day., Disp: 180 tablet, Rfl: 0    valACYclovir (Valtrex) 500 MG tablet, Take 1 tablet by mouth Daily., Disp: 90  "tablet, Rfl: 3    No Known Allergies    Objective     Physical Exam:  Vital Signs:   Vitals:    06/28/25 1030   BP: 120/78   Pulse: 77   Resp: 16   Temp: 97.4 °F (36.3 °C)   TempSrc: Temporal   SpO2: 99%   Weight: 59.8 kg (131 lb 12.8 oz)   Height: 165.1 cm (65\")     Body mass index is 21.93 kg/m².  BMI is within normal parameters. No other follow-up for BMI required.       Physical Exam  Constitutional:       Appearance: She is not ill-appearing.   HENT:      Head: Normocephalic.      Right Ear: External ear normal.      Left Ear: External ear normal.   Eyes:      Conjunctiva/sclera: Conjunctivae normal.      Pupils: Pupils are equal, round, and reactive to light.   Cardiovascular:      Rate and Rhythm: Normal rate and regular rhythm.      Heart sounds: Normal heart sounds.   Pulmonary:      Effort: Pulmonary effort is normal.      Breath sounds: Normal breath sounds.   Musculoskeletal:      Cervical back: Normal range of motion and neck supple.   Skin:     General: Skin is warm.      Capillary Refill: Capillary refill takes less than 2 seconds.      Comments: No pallor, pale conjunctiva   Neurological:      Mental Status: She is alert and oriented to person, place, and time.      Coordination: Coordination normal.      Gait: Gait normal.   Psychiatric:         Mood and Affect: Mood normal.         Behavior: Behavior normal.         Thought Content: Thought content normal.             Assessment / Plan      Assessment/Plan:   Diagnoses and all orders for this visit:    1. Other iron deficiency anemia (Primary)    2. Anemia, unspecified type  -     CBC & Differential  -     Iron  -     Vitamin B12 and Folate  -     Ferritin  -     Reticulocytes  -     PERIPHERAL SMEAR, [facility]  -     Ambulatory Referral to Hematology    3. Hyperthyroidism       - Continue medication as prescribed by Endocrinology           Follow Up:   Return if symptoms worsen or fail to improve.    Patient or patient representative verbalized " consent for the use of Ambient Listening during the visit with  MANGO Lawrence for chart documentation. 6/30/2025  18:16 EDT    Patient was given instructions and counseling regarding her condition or for health maintenance advice. Please see specific information pulled into the AVS if appropriate.       [facility] [address] [facility]     Please note that portions of this note may have been completed with a voice recognition program. Efforts were made to edit dictation, but occasionally words are mistranscribed.

## 2025-06-30 ENCOUNTER — LAB (OUTPATIENT)
Dept: LAB | Facility: HOSPITAL | Age: 30
End: 2025-06-30
Payer: COMMERCIAL

## 2025-06-30 LAB
BASOPHILS # BLD AUTO: 0.03 10*3/MM3 (ref 0–0.2)
BASOPHILS NFR BLD AUTO: 0.5 % (ref 0–1.5)
DEPRECATED RDW RBC AUTO: 39.3 FL (ref 37–54)
EOSINOPHIL # BLD AUTO: 0.24 10*3/MM3 (ref 0–0.4)
EOSINOPHIL NFR BLD AUTO: 4 % (ref 0.3–6.2)
ERYTHROCYTE [DISTWIDTH] IN BLOOD BY AUTOMATED COUNT: 16.2 % (ref 12.3–15.4)
HCT VFR BLD AUTO: 29.8 % (ref 34–46.6)
HGB BLD-MCNC: 7.9 G/DL (ref 12–15.9)
IMM GRANULOCYTES # BLD AUTO: 0.01 10*3/MM3 (ref 0–0.05)
IMM GRANULOCYTES NFR BLD AUTO: 0.2 % (ref 0–0.5)
LYMPHOCYTES # BLD AUTO: 2.48 10*3/MM3 (ref 0.7–3.1)
LYMPHOCYTES NFR BLD AUTO: 41.8 % (ref 19.6–45.3)
MCH RBC QN AUTO: 18 PG (ref 26.6–33)
MCHC RBC AUTO-ENTMCNC: 26.5 G/DL (ref 31.5–35.7)
MCV RBC AUTO: 68 FL (ref 79–97)
MONOCYTES # BLD AUTO: 0.54 10*3/MM3 (ref 0.1–0.9)
MONOCYTES NFR BLD AUTO: 9.1 % (ref 5–12)
NEUTROPHILS NFR BLD AUTO: 2.64 10*3/MM3 (ref 1.7–7)
NEUTROPHILS NFR BLD AUTO: 44.4 % (ref 42.7–76)
PLATELET # BLD AUTO: 395 10*3/MM3 (ref 140–450)
PMV BLD AUTO: 9.4 FL (ref 6–12)
RBC # BLD AUTO: 4.38 10*6/MM3 (ref 3.77–5.28)
RETICS # AUTO: 0.04 10*6/MM3 (ref 0.02–0.13)
RETICS/RBC NFR AUTO: 1 % (ref 0.7–1.9)
WBC NRBC COR # BLD AUTO: 5.94 10*3/MM3 (ref 3.4–10.8)

## 2025-06-30 PROCEDURE — 85025 COMPLETE CBC W/AUTO DIFF WBC: CPT | Performed by: NURSE PRACTITIONER

## 2025-06-30 PROCEDURE — 82746 ASSAY OF FOLIC ACID SERUM: CPT | Performed by: NURSE PRACTITIONER

## 2025-06-30 PROCEDURE — 82728 ASSAY OF FERRITIN: CPT | Performed by: NURSE PRACTITIONER

## 2025-06-30 PROCEDURE — 36415 COLL VENOUS BLD VENIPUNCTURE: CPT | Performed by: NURSE PRACTITIONER

## 2025-06-30 PROCEDURE — 83540 ASSAY OF IRON: CPT | Performed by: NURSE PRACTITIONER

## 2025-06-30 PROCEDURE — 85045 AUTOMATED RETICULOCYTE COUNT: CPT | Performed by: NURSE PRACTITIONER

## 2025-06-30 PROCEDURE — 82607 VITAMIN B-12: CPT | Performed by: NURSE PRACTITIONER

## 2025-07-01 LAB
FERRITIN SERPL-MCNC: 6.08 NG/ML (ref 13–150)
FOLATE SERPL-MCNC: 9.68 NG/ML (ref 4.78–24.2)
IRON 24H UR-MRATE: 10 MCG/DL (ref 37–145)
VIT B12 BLD-MCNC: 383 PG/ML (ref 211–946)

## 2025-07-16 DIAGNOSIS — E05.90 HYPERTHYROIDISM: ICD-10-CM

## 2025-07-16 RX ORDER — METOPROLOL TARTRATE 25 MG/1
25 TABLET, FILM COATED ORAL 2 TIMES DAILY
Qty: 180 TABLET | Refills: 0 | Status: SHIPPED | OUTPATIENT
Start: 2025-07-16